# Patient Record
Sex: MALE | Race: WHITE | NOT HISPANIC OR LATINO | Employment: FULL TIME | URBAN - NONMETROPOLITAN AREA
[De-identification: names, ages, dates, MRNs, and addresses within clinical notes are randomized per-mention and may not be internally consistent; named-entity substitution may affect disease eponyms.]

---

## 2018-01-28 ENCOUNTER — APPOINTMENT (OUTPATIENT)
Dept: CT IMAGING | Facility: HOSPITAL | Age: 53
End: 2018-01-28
Attending: PHYSICIAN ASSISTANT
Payer: COMMERCIAL

## 2018-01-28 ENCOUNTER — HOSPITAL ENCOUNTER (EMERGENCY)
Facility: HOSPITAL | Age: 53
Discharge: HOME | End: 2018-01-28
Attending: PHYSICIAN ASSISTANT | Admitting: PHYSICIAN ASSISTANT
Payer: COMMERCIAL

## 2018-01-28 VITALS
RESPIRATION RATE: 20 BRPM | BODY MASS INDEX: 26.95 KG/M2 | SYSTOLIC BLOOD PRESSURE: 127 MMHG | HEART RATE: 81 BPM | OXYGEN SATURATION: 98 % | TEMPERATURE: 98.4 F | DIASTOLIC BLOOD PRESSURE: 80 MMHG | WEIGHT: 167 LBS

## 2018-01-28 DIAGNOSIS — N20.0 CALCULUS OF KIDNEY: ICD-10-CM

## 2018-01-28 LAB
ALBUMIN UR-MCNC: 10 MG/DL
ANION GAP SERPL CALCULATED.3IONS-SCNC: 9 MMOL/L (ref 3–14)
APPEARANCE UR: CLEAR
BACTERIA #/AREA URNS HPF: ABNORMAL /HPF
BASOPHILS # BLD AUTO: 0.1 10E9/L (ref 0–0.2)
BASOPHILS NFR BLD AUTO: 0.4 %
BILIRUB UR QL STRIP: NEGATIVE
BUN SERPL-MCNC: 13 MG/DL (ref 7–30)
CALCIUM SERPL-MCNC: 8.8 MG/DL (ref 8.5–10.1)
CHLORIDE SERPL-SCNC: 106 MMOL/L (ref 94–109)
CO2 SERPL-SCNC: 25 MMOL/L (ref 20–32)
COLOR UR AUTO: YELLOW
CREAT SERPL-MCNC: 0.7 MG/DL (ref 0.66–1.25)
DIFFERENTIAL METHOD BLD: ABNORMAL
EOSINOPHIL # BLD AUTO: 0.1 10E9/L (ref 0–0.7)
EOSINOPHIL NFR BLD AUTO: 0.5 %
ERYTHROCYTE [DISTWIDTH] IN BLOOD BY AUTOMATED COUNT: 12.1 % (ref 10–15)
GFR SERPL CREATININE-BSD FRML MDRD: >90 ML/MIN/1.7M2
GLUCOSE SERPL-MCNC: 117 MG/DL (ref 70–99)
GLUCOSE UR STRIP-MCNC: NEGATIVE MG/DL
HCT VFR BLD AUTO: 43.4 % (ref 40–53)
HGB BLD-MCNC: 15.1 G/DL (ref 13.3–17.7)
HGB UR QL STRIP: ABNORMAL
IMM GRANULOCYTES # BLD: 0.1 10E9/L (ref 0–0.4)
IMM GRANULOCYTES NFR BLD: 0.7 %
KETONES UR STRIP-MCNC: NEGATIVE MG/DL
LEUKOCYTE ESTERASE UR QL STRIP: NEGATIVE
LYMPHOCYTES # BLD AUTO: 2.1 10E9/L (ref 0.8–5.3)
LYMPHOCYTES NFR BLD AUTO: 14.3 %
MCH RBC QN AUTO: 31 PG (ref 26.5–33)
MCHC RBC AUTO-ENTMCNC: 34.8 G/DL (ref 31.5–36.5)
MCV RBC AUTO: 89 FL (ref 78–100)
MONOCYTES # BLD AUTO: 1 10E9/L (ref 0–1.3)
MONOCYTES NFR BLD AUTO: 7 %
MUCOUS THREADS #/AREA URNS LPF: PRESENT /LPF
NEUTROPHILS # BLD AUTO: 11.4 10E9/L (ref 1.6–8.3)
NEUTROPHILS NFR BLD AUTO: 77.1 %
NITRATE UR QL: NEGATIVE
NRBC # BLD AUTO: 0 10*3/UL
NRBC BLD AUTO-RTO: 0 /100
PH UR STRIP: 5.5 PH (ref 4.7–8)
PLATELET # BLD AUTO: 391 10E9/L (ref 150–450)
POTASSIUM SERPL-SCNC: 4.5 MMOL/L (ref 3.4–5.3)
RBC # BLD AUTO: 4.87 10E12/L (ref 4.4–5.9)
RBC #/AREA URNS AUTO: >182 /HPF (ref 0–2)
SODIUM SERPL-SCNC: 140 MMOL/L (ref 133–144)
SOURCE: ABNORMAL
SP GR UR STRIP: 1.01 (ref 1–1.03)
UROBILINOGEN UR STRIP-MCNC: NORMAL MG/DL (ref 0–2)
WBC # BLD AUTO: 14.8 10E9/L (ref 4–11)
WBC #/AREA URNS AUTO: 2 /HPF (ref 0–2)

## 2018-01-28 PROCEDURE — 85025 COMPLETE CBC W/AUTO DIFF WBC: CPT | Performed by: PHYSICIAN ASSISTANT

## 2018-01-28 PROCEDURE — 99285 EMERGENCY DEPT VISIT HI MDM: CPT | Mod: 25

## 2018-01-28 PROCEDURE — 74176 CT ABD & PELVIS W/O CONTRAST: CPT | Mod: TC

## 2018-01-28 PROCEDURE — 96376 TX/PRO/DX INJ SAME DRUG ADON: CPT

## 2018-01-28 PROCEDURE — 81001 URINALYSIS AUTO W/SCOPE: CPT | Performed by: PHYSICIAN ASSISTANT

## 2018-01-28 PROCEDURE — 96374 THER/PROPH/DIAG INJ IV PUSH: CPT

## 2018-01-28 PROCEDURE — 99284 EMERGENCY DEPT VISIT MOD MDM: CPT | Performed by: PHYSICIAN ASSISTANT

## 2018-01-28 PROCEDURE — 25000128 H RX IP 250 OP 636: Performed by: PHYSICIAN ASSISTANT

## 2018-01-28 PROCEDURE — 96375 TX/PRO/DX INJ NEW DRUG ADDON: CPT

## 2018-01-28 PROCEDURE — 36415 COLL VENOUS BLD VENIPUNCTURE: CPT | Performed by: PHYSICIAN ASSISTANT

## 2018-01-28 PROCEDURE — 80048 BASIC METABOLIC PNL TOTAL CA: CPT | Performed by: PHYSICIAN ASSISTANT

## 2018-01-28 RX ORDER — MORPHINE SULFATE 4 MG/ML
4 INJECTION, SOLUTION INTRAMUSCULAR; INTRAVENOUS ONCE
Status: COMPLETED | OUTPATIENT
Start: 2018-01-28 | End: 2018-01-28

## 2018-01-28 RX ORDER — MORPHINE SULFATE 4 MG/ML
4 INJECTION, SOLUTION INTRAMUSCULAR; INTRAVENOUS
Status: DISCONTINUED | OUTPATIENT
Start: 2018-01-28 | End: 2018-01-28 | Stop reason: HOSPADM

## 2018-01-28 RX ORDER — KETOROLAC TROMETHAMINE 10 MG/1
10 TABLET, FILM COATED ORAL EVERY 6 HOURS PRN
Qty: 20 TABLET | Refills: 0 | Status: SHIPPED | OUTPATIENT
Start: 2018-01-28 | End: 2018-04-04

## 2018-01-28 RX ORDER — ONDANSETRON 2 MG/ML
4 INJECTION INTRAMUSCULAR; INTRAVENOUS ONCE
Status: COMPLETED | OUTPATIENT
Start: 2018-01-28 | End: 2018-01-28

## 2018-01-28 RX ORDER — KETOROLAC TROMETHAMINE 30 MG/ML
30 INJECTION, SOLUTION INTRAMUSCULAR; INTRAVENOUS ONCE
Status: COMPLETED | OUTPATIENT
Start: 2018-01-28 | End: 2018-01-28

## 2018-01-28 RX ADMIN — MORPHINE SULFATE 4 MG: 4 INJECTION, SOLUTION INTRAMUSCULAR; INTRAVENOUS at 15:37

## 2018-01-28 RX ADMIN — ONDANSETRON 4 MG: 2 INJECTION INTRAMUSCULAR; INTRAVENOUS at 15:37

## 2018-01-28 RX ADMIN — KETOROLAC TROMETHAMINE 30 MG: 30 INJECTION, SOLUTION INTRAMUSCULAR at 16:58

## 2018-01-28 RX ADMIN — MORPHINE SULFATE 4 MG: 4 INJECTION, SOLUTION INTRAMUSCULAR; INTRAVENOUS at 16:58

## 2018-01-28 RX ADMIN — HYDROMORPHONE HYDROCHLORIDE 0.5 MG: 1 INJECTION, SOLUTION INTRAMUSCULAR; INTRAVENOUS; SUBCUTANEOUS at 16:07

## 2018-01-28 ASSESSMENT — ENCOUNTER SYMPTOMS
CARDIOVASCULAR NEGATIVE: 1
DYSURIA: 0
PSYCHIATRIC NEGATIVE: 1
CHILLS: 0
NAUSEA: 0
FLANK PAIN: 1
ABDOMINAL PAIN: 0
FEVER: 0
HEMATURIA: 0
NEUROLOGICAL NEGATIVE: 1
DIFFICULTY URINATING: 0
FREQUENCY: 0
BACK PAIN: 0
FATIGUE: 0

## 2018-01-28 NOTE — ED AVS SNAPSHOT
[facility]    [address] [place] [address]    Phone:  [phone]                                       Wesley Kim   MRN: [id number]    Department:  [facility]   Date of Visit:  1/28/2018           After Visit Summary Signature Page     I have received my discharge instructions, and my questions have been answered. I have discussed any challenges I see with this plan with the nurse or doctor.    ..........................................................................................................................................  Patient/Patient Representative Signature      ..........................................................................................................................................  Patient Representative Print Name and Relationship to Patient    ..................................................               ................................................  Date                                            Time    ..........................................................................................................................................  Reviewed by Signature/Title    ...................................................              ..............................................  Date                                                            Time

## 2018-01-28 NOTE — ED PROVIDER NOTES
History     Chief Complaint   Patient presents with     Flank Pain     R flank pain for last 1.5 hours - hx of kidney stones and total of 24 stents - last 2 placed July 2016     The history is provided by the patient. No  was used.     Wesley Kim is a 52 year old male with DM, recurrent renal stones, who presents with right flank pain starting over the past 2 hrs. Pain radiates into the right groin. No hematuria. No urgency. No fevers. He denies injury.      Problem List:    Patient Active Problem List    Diagnosis Date Noted     Renal colic 10/14/2015     Priority: Medium     Nephrolithiasis 10/14/2015     Priority: Medium        Past Medical History:    Past Medical History:   Diagnosis Date     Diabetes (H)      Epidermolysis bullosa      Peripheral neuropathy      Recurrent kidney stones        Past Surgical History:    Past Surgical History:   Procedure Laterality Date     GENITOURINARY SURGERY      14 procedures for stones       Family History:    Family History   Problem Relation Age of Onset     Thyroid Disease Mother      Hypertension Father      Hypertension Brother        Social History:  Marital Status:   [2]  Social History   Substance Use Topics     Smoking status: Current Every Day Smoker     Packs/day: 0.50     Years: 28.00     Smokeless tobacco: Never Used     Alcohol use No        Medications:      ketorolac (TORADOL) 10 MG tablet   OxyCODONE HCl (OXYCONTIN PO)   OxyCODONE HCl (OXYCONTIN PO)   PRAVASTATIN SODIUM PO   POTASSIUM CITRATE PO   METFORMIN HCL PO   ALLOPURINOL PO   LISINOPRIL PO   aspirin 81 MG tablet         Review of Systems   Constitutional: Negative for chills, fatigue and fever.   Cardiovascular: Negative.    Gastrointestinal: Negative for abdominal pain and nausea.   Genitourinary: Positive for flank pain. Negative for difficulty urinating, dysuria, frequency, hematuria and urgency.   Musculoskeletal: Negative for back pain.   Skin: Negative.     Neurological: Negative.    Psychiatric/Behavioral: Negative.        Physical Exam   BP: 163/77  Pulse: 81  Heart Rate: 67  Temp: 97.1  F (36.2  C)  Resp: 18  Weight: 75.8 kg (167 lb)  SpO2: 97 %      Physical Exam   Constitutional: He is oriented to person, place, and time. He appears well-developed and well-nourished. No distress.   Eyes: Conjunctivae are normal.   Cardiovascular: Normal rate, regular rhythm and normal heart sounds.    Pulmonary/Chest: Effort normal and breath sounds normal.   Abdominal: Soft. Bowel sounds are normal. There is no tenderness. There is CVA tenderness (right).   Neurological: He is alert and oriented to person, place, and time.   Skin: Skin is warm and dry.   Psychiatric: He has a normal mood and affect.   Nursing note and vitals reviewed.      ED Course     ED Course     Procedures      Labs Ordered and Resulted from Time of ED Arrival Up to the Time of Departure from the ED   URINE MACROSCOPIC WITH REFLEX TO MICRO - Abnormal; Notable for the following:        Result Value    Blood Urine Large (*)     Protein Albumin Urine 10 (*)     RBC Urine >182 (*)     Bacteria Urine None (*)     Mucous Urine Present (*)     All other components within normal limits   CBC WITH PLATELETS DIFFERENTIAL - Abnormal; Notable for the following:     WBC 14.8 (*)     Absolute Neutrophil 11.4 (*)     All other components within normal limits   BASIC METABOLIC PANEL - Abnormal; Notable for the following:     Glucose 117 (*)     All other components within normal limits     EXAMINATION: CT ABDOMEN PELVIS W/O CONTRAST, 1/28/2018 4:32 PM     TECHNIQUE:  Helical CT images from the lung bases through the  symphysis pubis were obtained  with IV contrast. Contrast dose:     COMPARISON: none     HISTORY: right flank pain, Hx stones;      FINDINGS:     There is dependent atelectasis at the lung bases.     The liver is free of masses or biliary ductal enlargement. No  calcified gallstones are seen.     The the  spleen and pancreas appear normal.     The adrenal glands are normal.     The right and left kidneys are free of masses. Multiple bilateral  renal calculi are noted. The largest measures approximately 15 mm in  diameter and the left kidney. There is dilation of the right renal  collecting system and right ureter down to the mid pelvic level where  3 to 4 mm in diameter calculus is noted.     The periaortic lymph nodes are normal in caliber.     No intraperitoneal masses or inflammatory changes are noted.     In the pelvis the bladder and rectum appear normal.     The regional skeleton is intact         IMPRESSION: 3 to 4 mm diameter right ureteral calculus at the mid  pelvic level. There is dilation of the right renal collecting system  associated with this calculus.  2. Multiple intrarenal calculi bilaterally.      MARCELINO JJ MD  Assessments & Plan (with Medical Decision Making)     I have reviewed the nursing notes.  I have reviewed the findings, diagnosis, plan and need for follow up with the patient.    Discharge Medication List as of 1/28/2018  6:05 PM      START taking these medications    Details   ketorolac (TORADOL) 10 MG tablet Take 1 tablet (10 mg) by mouth every 6 hours as needed for moderate pain, Disp-20 tablet, R-0, Local Print             Final diagnoses:   Calculus of kidney   After IVF and pain control, pt reports he thinks the stone shifted and he will pass it. I offered to contact [facility] urology and he declines. He states this is not the first, but in fact the 13,000th + stone he will have passed and would like his IV removed and to return home this evening. He does have an appt with his primary tomorrow and will arrange for urology follow up then. He will return here with ANY worsening, fevers, concerns prior. He is discharge pain free, Rx for toradol, and has flomax at home that he will take until he sees primary tomorrow.     1/28/2018   [facility]     Dalton Curran,  PA  01/29/18 0157

## 2018-01-28 NOTE — ED AVS SNAPSHOT
[facility]    [address] [address]    Phone:  [phone]                                       Wesley Kim   MRN: [id number]    Department:  [facility]   Date of Visit:  1/28/2018           Patient Information     Date Of Birth          1965        Your diagnoses for this visit were:     Calculus of kidney        You were seen by Dalton Currna PA.      Follow-up Information     Follow up with Ruben Vargas MD In 1 day.    Specialty:  Family Practice    Contact information:    [facility]  [address] [address] [phone]          Follow up with [facility].    Specialty:  EMERGENCY MEDICINE    Why:  If symptoms worsen    Contact information:    [address] [address] [phone]    Additional information:    From [facility]: Take [address]. Turn left at [address]. Turn left at the first stoplight on [address]. At the first stop sign, take a right onto [address]. Take a left into the parking lot and continue through until you reach the North enterance of the building.       From [place]: Take [address]. Take the [address] ramp towards [place]. Turn left onto [address]. Take a slight right onto [address]. Turn left at the first stoplight on [address]. At the first stop sign, take a right onto [address]. Take a left into the parking lot and continue through until you reach the North enterance of the building.       From [place]: Take [address]. Take a right at [address]. At the first stop sign, take a right onto [address]. Take a left into the parking lot and continue through until you reach the North enterance of the building.         Discharge Instructions       - flomax, toradol  - FLUIDS  - Dr Vargas tomorrow - back here with fever or return of pain    (2 more stones in kidney on  right, huge 1.5cm stone on left!)    Discharge References/Attachments     KIDNEY STONES, TREATING: EXPECTANT THERAPY (ENGLISH)         Review of your medicines      START taking        Dose / Directions Last dose taken    ketorolac 10 MG tablet   Commonly known as:  TORADOL   Dose:  10 mg   Quantity:  20 tablet        Take 1 tablet (10 mg) by mouth every 6 hours as needed for moderate pain   Refills:  0          Our records show that you are taking the medicines listed below. If these are incorrect, please call your family doctor or clinic.        Dose / Directions Last dose taken    ALLOPURINOL PO   Dose:  300 mg        Take 300 mg by mouth daily   Refills:  0        aspirin 81 MG tablet   Dose:  81 mg        Take 81 mg by mouth daily   Refills:  0        LISINOPRIL PO   Dose:  10 mg        Take 10 mg by mouth daily   Refills:  0        METFORMIN HCL PO   Dose:  850 mg        Take 850 mg by mouth 2 times daily (with meals)   Refills:  0        * OXYCONTIN PO   Dose:  40 mg        Take 40 mg by mouth every 12 hours   Refills:  0        * OXYCONTIN PO   Dose:  20 mg        Take 20 mg by mouth daily as needed   Refills:  0        POTASSIUM CITRATE PO   Dose:  10 mEq        Take 10 mEq by mouth 2 times daily   Refills:  0        PRAVASTATIN SODIUM PO   Dose:  80 mg        Take 80 mg by mouth daily   Refills:  0        * Notice:  This list has 2 medication(s) that are the same as other medications prescribed for you. Read the directions carefully, and ask your doctor or other care provider to review them with you.            Prescriptions were sent or printed at these locations (1 Prescription)                   [facility] [address] [address]    Telephone:  [phone]   Fax:  [phone]   Hours:                  Printed at Department/Unit printer (1 of 1)         ketorolac (TORADOL) 10 MG tablet               "  Procedures and tests performed during your visit     Abd/pelvis CT - no contrast - Stone Protocol    Basic metabolic panel    CBC with platelets differential    UA reflex to Microscopic      Orders Needing Specimen Collection     None      Pending Results     No orders found from 2018 to 2018.            Pending Culture Results     No orders found from 2018 to 2018.            Thank you for choosing [facility]       Thank you for choosing [facility] for your care. Our goal is always to provide you with excellent care. Hearing back from our patients is one way we can continue to improve our services. Please take a few minutes to complete the written survey that you may receive in the mail after you visit with us. Thank you!        [employer] Information     [employer] lets you send messages to your doctor, view your test results, renew your prescriptions, schedule appointments and more. To sign up, go to [email or web address] . Click on \"Log in\" on the left side of the screen, which will take you to the Welcome page. Then click on \"Sign up Now\" on the right side of the page.     You will be asked to enter the access code listed below, as well as some personal information. Please follow the directions to create your username and password.     Your access code is: [email or web address]  Expires: 2018  6:01 PM     Your access code will  in 90 days. If you need help or a new code, please call your [facility] clinic or [phone].        Care EveryWhere ID     This is your Care EveryWhere ID. This could be used by other organizations to access your [facility] medical records  [id number]        Equal Access to Services     [facility] MARAH : [place] to Soson, waaxda luqadaha, qaybta kaalmada adefrederic, jordan davis. So [facility] [phone].    ATENCIÓN: Si habla español, tiene a rehman disposición servicios gratuitos de asistencia lingüística. Llame al [phone].    We " comply with applicable federal civil rights laws and [place] laws. We do not discriminate on the basis of race, color, national origin, age, disability, sex, sexual orientation, or gender identity.            After Visit Summary       This is your record. Keep this with you and show to your community pharmacist(s) and doctor(s) at your next visit.

## 2018-01-29 NOTE — DISCHARGE INSTRUCTIONS
- flomax, toradol  - FLUIDS  - Dr Vargas tomorrow - back here with fever or return of pain    (2 more stones in kidney on right, huge 1.5cm stone on left!)

## 2018-04-04 ENCOUNTER — HOSPITAL ENCOUNTER (EMERGENCY)
Facility: HOSPITAL | Age: 53
Discharge: HOME | End: 2018-04-04
Attending: FAMILY MEDICINE | Admitting: FAMILY MEDICINE
Payer: COMMERCIAL

## 2018-04-04 VITALS
SYSTOLIC BLOOD PRESSURE: 151 MMHG | TEMPERATURE: 97.6 F | OXYGEN SATURATION: 98 % | HEART RATE: 64 BPM | RESPIRATION RATE: 18 BRPM | DIASTOLIC BLOOD PRESSURE: 109 MMHG

## 2018-04-04 DIAGNOSIS — G25.3 SLEEP MYOCLONUS: ICD-10-CM

## 2018-04-04 LAB
ALBUMIN SERPL-MCNC: 4 G/DL (ref 3.4–5)
ALP SERPL-CCNC: 98 U/L (ref 40–150)
ALT SERPL W P-5'-P-CCNC: 33 U/L (ref 0–70)
ANION GAP SERPL CALCULATED.3IONS-SCNC: 8 MMOL/L (ref 3–14)
AST SERPL W P-5'-P-CCNC: 19 U/L (ref 0–45)
BILIRUB SERPL-MCNC: 0.6 MG/DL (ref 0.2–1.3)
BUN SERPL-MCNC: 9 MG/DL (ref 7–30)
CALCIUM SERPL-MCNC: 8.5 MG/DL (ref 8.5–10.1)
CHLORIDE SERPL-SCNC: 108 MMOL/L (ref 94–109)
CO2 SERPL-SCNC: 24 MMOL/L (ref 20–32)
CREAT SERPL-MCNC: 0.76 MG/DL (ref 0.66–1.25)
GFR SERPL CREATININE-BSD FRML MDRD: >90 ML/MIN/1.7M2
GLUCOSE SERPL-MCNC: 156 MG/DL (ref 70–99)
MAGNESIUM SERPL-MCNC: 2.2 MG/DL (ref 1.6–2.3)
POTASSIUM SERPL-SCNC: 3.7 MMOL/L (ref 3.4–5.3)
PROT SERPL-MCNC: 7.7 G/DL (ref 6.8–8.8)
SODIUM SERPL-SCNC: 140 MMOL/L (ref 133–144)

## 2018-04-04 PROCEDURE — 80053 COMPREHEN METABOLIC PANEL: CPT | Performed by: FAMILY MEDICINE

## 2018-04-04 PROCEDURE — 36415 COLL VENOUS BLD VENIPUNCTURE: CPT | Performed by: FAMILY MEDICINE

## 2018-04-04 PROCEDURE — 99283 EMERGENCY DEPT VISIT LOW MDM: CPT

## 2018-04-04 PROCEDURE — 83735 ASSAY OF MAGNESIUM: CPT | Performed by: FAMILY MEDICINE

## 2018-04-04 PROCEDURE — 99284 EMERGENCY DEPT VISIT MOD MDM: CPT | Performed by: PHYSICIAN ASSISTANT

## 2018-04-04 RX ORDER — CLONAZEPAM 1 MG/1
1 TABLET ORAL
Qty: 15 TABLET | Refills: 0 | Status: SHIPPED | OUTPATIENT
Start: 2018-04-04 | End: 2020-04-18

## 2018-04-04 ASSESSMENT — ENCOUNTER SYMPTOMS
HEADACHES: 0
DIZZINESS: 0
ABDOMINAL PAIN: 0
FEVER: 0
CHILLS: 0
SHORTNESS OF BREATH: 0
SLEEP DISTURBANCE: 1
LIGHT-HEADEDNESS: 0
WEAKNESS: 0
NUMBNESS: 0

## 2018-04-04 NOTE — DISCHARGE INSTRUCTIONS
Your symptoms are consistent with sleep myoclonus.  This is rarely associated with significant neurologic disease, but can be a sign of disease is it continues.  Good sleep hygiene is very important.  Being a night owl can be good for some things, but bad for others.  You should stop the muscle relaxer.  I have prescribed a medication to hopefully help your symptoms.  This medication can be sedating, but has some evidence to show it helps sleep jerking. This should be started about 30 minutes prior to bedtime and not used during the day.  Your labs are OK.  Follow-up in the clinic for persistent symptoms.  Return here as needed.

## 2018-04-04 NOTE — ED AVS SNAPSHOT
[facility]    [address] [place] [address]    Phone:  [phone]                                       Wesley Kim   MRN: [id number]    Department[facility]  [facility]   Date of Visit:  4/4/2018           After Visit Summary Signature Page     I have received my discharge instructions, and my questions have been answered. I have discussed any challenges I see with this plan with the nurse or doctor.    ..........................................................................................................................................  Patient/Patient Representative Signature      ..........................................................................................................................................  Patient Representative Print Name and Relationship to Patient    ..................................................               ................................................  Date                                            Time    ..........................................................................................................................................  Reviewed by Signature/Title    ...................................................              ..............................................  Date                                                            Time

## 2018-04-04 NOTE — ED PROVIDER NOTES
History     Chief Complaint   Patient presents with     Flank Pain     hx of kidney stones     Insomnia     Spasms     The history is provided by the patient.     Wesley Kim is a 52 year old male who presented to the emergency department ambulatory for evaluation of sleep jerking. Wesley tells me that he has began to experience jerking when falling asleep over the last 2 weeks and has not been able to sleep well.  He was seen yesterday in the clinic and started on Baclofen.  This has not helped.  He is rather desperate for help sleeping.  He has no weakness. No jerking while awake.  Does not drink.  Reports that spasms are significant when trying to sleep.     Problem List:    Patient Active Problem List    Diagnosis Date Noted     Renal colic 10/14/2015     Priority: Medium     Nephrolithiasis 10/14/2015     Priority: Medium        Past Medical History:    Past Medical History:   Diagnosis Date     Diabetes (H)      Epidermolysis bullosa      Peripheral neuropathy      Recurrent kidney stones        Past Surgical History:    Past Surgical History:   Procedure Laterality Date     GENITOURINARY SURGERY      14 procedures for stones       Family History:    Family History   Problem Relation Age of Onset     Thyroid Disease Mother      Hypertension Father      Hypertension Brother        Social History:  Marital Status:   [2]  Social History   Substance Use Topics     Smoking status: Current Every Day Smoker     Packs/day: 0.50     Years: 28.00     Smokeless tobacco: Never Used     Alcohol use No        Medications:      clonazePAM (KLONOPIN) 1 MG tablet   PRAVASTATIN SODIUM PO   POTASSIUM CITRATE PO   METFORMIN HCL PO   ALLOPURINOL PO   LISINOPRIL PO   aspirin 81 MG tablet         Review of Systems   Constitutional: Negative for chills and fever.   Respiratory: Negative for shortness of breath.    Gastrointestinal: Negative for abdominal pain.   Genitourinary: Negative.    Skin: Negative.    Neurological:  Negative for dizziness, weakness, light-headedness, numbness and headaches.   Psychiatric/Behavioral: Positive for sleep disturbance.       Physical Exam   BP: 170/85  Pulse: 73  Temp: 97.2  F (36.2  C)  Resp: 18  SpO2: 98 %      Physical Exam   Constitutional: He is oriented to person, place, and time. He appears well-developed and well-nourished.   Pleasant and talkative    Cardiovascular: Normal rate and regular rhythm.    Pulmonary/Chest: Effort normal.   Neurological: He is alert and oriented to person, place, and time.   Normal gait.    Skin: Skin is dry.   Psychiatric: He has a normal mood and affect.   Nursing note and vitals reviewed.      ED Course     ED Course     Procedures               Critical Care time:  none               Results for orders placed or performed during the hospital encounter of 04/04/18 (from the past 24 hour(s))   Comprehensive metabolic panel   Result Value Ref Range    Sodium 140 133 - 144 mmol/L    Potassium 3.7 3.4 - 5.3 mmol/L    Chloride 108 94 - 109 mmol/L    Carbon Dioxide 24 20 - 32 mmol/L    Anion Gap 8 3 - 14 mmol/L    Glucose 156 (H) 70 - 99 mg/dL    Urea Nitrogen 9 7 - 30 mg/dL    Creatinine 0.76 0.66 - 1.25 mg/dL    GFR Estimate >90 >60 mL/min/1.7m2    GFR Estimate If Black >90 >60 mL/min/1.7m2    Calcium 8.5 8.5 - 10.1 mg/dL    Bilirubin Total 0.6 0.2 - 1.3 mg/dL    Albumin 4.0 3.4 - 5.0 g/dL    Protein Total 7.7 6.8 - 8.8 g/dL    Alkaline Phosphatase 98 40 - 150 U/L    ALT 33 0 - 70 U/L    AST 19 0 - 45 U/L   Magnesium   Result Value Ref Range    Magnesium 2.2 1.6 - 2.3 mg/dL       Medications - No data to display    Assessments & Plan (with Medical Decision Making)   Labs as above.  Wesley is desperate for some help.  Seems low risk for significant neurological disease causing the symptoms, but obviously cannot be zero risk.  Needs close close follow-up with Dr. Vargas.  Stop the Baclofen. Some evidence for Clonazepam.  Long discussion.  Short prescription.  Return  here as needed.     I have reviewed the nursing notes.    I have reviewed the findings, diagnosis, plan and need for follow up with the patient.       New Prescriptions    CLONAZEPAM (KLONOPIN) 1 MG TABLET    Take 1 tablet (1 mg) by mouth nightly as needed for anxiety       Final diagnoses:   Sleep myoclonus       4/4/2018   [facility]     Yissel Benites PA-C  04/04/18 1045

## 2018-04-04 NOTE — ED NOTES
In triage, pt had a long list of complaints from diarrhea to muscle spasms that he received a muscle relaxer from Dr Ware yesterday.  Pt reports they didn't help and took 4 tabs with no relief.

## 2018-04-04 NOTE — ED AVS SNAPSHOT
[facility]    [address] [address]    Phone:  [phone]                                       Wesley Kim   MRN: [id number]    Department:  [facility]   Date of Visit:  4/4/2018           Patient Information     Date Of Birth          1965        Your diagnoses for this visit were:     Sleep myoclonus        You were seen by Sugar Becker MD and Yissel Benites PA-C.      Follow-up Information     Schedule an appointment as soon as possible for a visit with Ruben Vargas MD.    Specialty:  Family Practice    Contact information:    [facility]  [address] [address] [phone]          Follow up with [facility].    Specialty:  EMERGENCY MEDICINE    Why:  If symptoms worsen    Contact information:    [address] [address] [phone]    Additional information:    From [facility], [place]: Take [address]. Turn left at [address]. Turn left at the first stoplight on [address]. At the first stop sign, take a right onto [address]. Take a left into the parking lot and continue through until you reach the North enterance of the building.       From [place]: Take [address]. Take the [address] ramp towards [place]. Turn left onto [address]. Take a slight right onto [address]. Turn left at the first stoplight on [address]. At the first stop sign, take a right onto [address]. Take a left into the parking lot and continue through until you reach the North enterance of the building.       From [place]: Take [address]. Take a right at [address]. At the first stop sign, take a right onto [address]. Take a left into the parking lot and continue through until you reach the North enterance of the building.         Discharge Instructions       Your symptoms are consistent with sleep myoclonus.  This  is rarely associated with significant neurologic disease, but can be a sign of disease is it continues.  Good sleep hygiene is very important.  Being a night owl can be good for some things, but bad for others.  You should stop the muscle relaxer.  I have prescribed a medication to hopefully help your symptoms.  This medication can be sedating, but has some evidence to show it helps sleep jerking. This should be started about 30 minutes prior to bedtime and not used during the day.  Your labs are OK.  Follow-up in the clinic for persistent symptoms.  Return here as needed.        Review of your medicines      START taking        Dose / Directions Last dose taken    clonazePAM 1 MG tablet   Commonly known as:  klonoPIN   Dose:  1 mg   Quantity:  15 tablet        Take 1 tablet (1 mg) by mouth nightly as needed for anxiety   Refills:  0          Our records show that you are taking the medicines listed below. If these are incorrect, please call your family doctor or clinic.        Dose / Directions Last dose taken    ALLOPURINOL PO   Dose:  300 mg        Take 300 mg by mouth daily   Refills:  0        aspirin 81 MG tablet   Dose:  81 mg        Take 81 mg by mouth daily   Refills:  0        LISINOPRIL PO   Dose:  10 mg        Take 10 mg by mouth daily   Refills:  0        METFORMIN HCL PO   Dose:  850 mg        Take 850 mg by mouth 2 times daily (with meals)   Refills:  0        POTASSIUM CITRATE PO   Dose:  10 mEq        Take 10 mEq by mouth 2 times daily   Refills:  0        PRAVASTATIN SODIUM PO   Dose:  80 mg        Take 80 mg by mouth daily   Refills:  0                Prescriptions were sent or printed at these locations (1 Prescription)                   [facility]  [address] [address]    Telephone:  [phone]   Fax:  [phone]   Hours:                  Printed at Department/Unit printer (1 of 1)          "clonazePAM (KLONOPIN) 1 MG tablet                Procedures and tests performed during your visit     Comprehensive metabolic panel    Magnesium      Orders Needing Specimen Collection     None      Pending Results     No orders found from 2018 to 2018.            Pending Culture Results     No orders found from 2018 to 2018.            Thank you for choosing [facility]       Thank you for choosing [facility] for your care. Our goal is always to provide you with excellent care. Hearing back from our patients is one way we can continue to improve our services. Please take a few minutes to complete the written survey that you may receive in the mail after you visit with us. Thank you!        [employer] Information     playnik lets you send messages to your doctor, view your test results, renew your prescriptions, schedule appointments and more. To sign up, go to [email or web address] . Click on \"Log in\" on the left side of the screen, which will take you to the Welcome page. Then click on \"Sign up Now\" on the right side of the page.     You will be asked to enter the access code listed below, as well as some personal information. Please follow the directions to create your username and password.     Your access code is: [email or web address]  Expires: 2018  7:01 PM     Your access code will  in 90 days. If you need help or a new code, please call your [facility] clinic or [phone].        Care EveryWhere ID     This is your Care EveryWhere ID. This could be used by other organizations to access your [facility] medical records  [id number]        Equal Access to Services     [facility] : Hadii joanna alberts hadasho Sokurtali, waaxda luqadaha, qaybta kaalmada adeegyada, jordan davis. So [facility] [phone].    ATENCIÓN: Si habla español, tiene a rehman disposición servicios gratuitos de asistencia lingüística. Llame al [phone].    We comply with applicable federal civil rights laws " and [place] laws. We do not discriminate on the basis of race, color, national origin, age, disability, sex, sexual orientation, or gender identity.            After Visit Summary       This is your record. Keep this with you and show to your community pharmacist(s) and doctor(s) at your next visit.

## 2020-04-18 ENCOUNTER — HOSPITAL ENCOUNTER (EMERGENCY)
Facility: HOSPITAL | Age: 55
Discharge: HOME | End: 2020-04-18
Attending: EMERGENCY MEDICINE | Admitting: EMERGENCY MEDICINE
Payer: COMMERCIAL

## 2020-04-18 ENCOUNTER — APPOINTMENT (OUTPATIENT)
Dept: CT IMAGING | Facility: HOSPITAL | Age: 55
End: 2020-04-18
Attending: EMERGENCY MEDICINE
Payer: COMMERCIAL

## 2020-04-18 VITALS
OXYGEN SATURATION: 95 % | SYSTOLIC BLOOD PRESSURE: 140 MMHG | TEMPERATURE: 97.9 F | RESPIRATION RATE: 16 BRPM | DIASTOLIC BLOOD PRESSURE: 75 MMHG

## 2020-04-18 DIAGNOSIS — N20.0 KIDNEY STONE: ICD-10-CM

## 2020-04-18 LAB
ALBUMIN UR-MCNC: 10 MG/DL
ANION GAP SERPL CALCULATED.3IONS-SCNC: 5 MMOL/L (ref 3–14)
APPEARANCE UR: CLEAR
BACTERIA #/AREA URNS HPF: ABNORMAL /HPF
BASOPHILS # BLD AUTO: 0.1 10E9/L (ref 0–0.2)
BASOPHILS NFR BLD AUTO: 0.5 %
BILIRUB UR QL STRIP: NEGATIVE
BUN SERPL-MCNC: 12 MG/DL (ref 7–30)
CALCIUM SERPL-MCNC: 8.5 MG/DL (ref 8.5–10.1)
CHLORIDE SERPL-SCNC: 106 MMOL/L (ref 94–109)
CO2 SERPL-SCNC: 24 MMOL/L (ref 20–32)
COLOR UR AUTO: YELLOW
CREAT SERPL-MCNC: 0.86 MG/DL (ref 0.66–1.25)
DIFFERENTIAL METHOD BLD: ABNORMAL
EOSINOPHIL # BLD AUTO: 0.1 10E9/L (ref 0–0.7)
EOSINOPHIL NFR BLD AUTO: 0.7 %
ERYTHROCYTE [DISTWIDTH] IN BLOOD BY AUTOMATED COUNT: 12.3 % (ref 10–15)
GFR SERPL CREATININE-BSD FRML MDRD: >90 ML/MIN/{1.73_M2}
GLUCOSE SERPL-MCNC: 172 MG/DL (ref 70–99)
GLUCOSE UR STRIP-MCNC: NEGATIVE MG/DL
HCT VFR BLD AUTO: 45.2 % (ref 40–53)
HGB BLD-MCNC: 15.8 G/DL (ref 13.3–17.7)
HGB UR QL STRIP: ABNORMAL
HYALINE CASTS #/AREA URNS LPF: 1 /LPF
IMM GRANULOCYTES # BLD: 0.1 10E9/L (ref 0–0.4)
IMM GRANULOCYTES NFR BLD: 0.6 %
KETONES UR STRIP-MCNC: NEGATIVE MG/DL
LEUKOCYTE ESTERASE UR QL STRIP: NEGATIVE
LYMPHOCYTES # BLD AUTO: 1.8 10E9/L (ref 0.8–5.3)
LYMPHOCYTES NFR BLD AUTO: 16.3 %
MCH RBC QN AUTO: 31.2 PG (ref 26.5–33)
MCHC RBC AUTO-ENTMCNC: 35 G/DL (ref 31.5–36.5)
MCV RBC AUTO: 89 FL (ref 78–100)
MONOCYTES # BLD AUTO: 0.9 10E9/L (ref 0–1.3)
MONOCYTES NFR BLD AUTO: 8 %
MUCOUS THREADS #/AREA URNS LPF: PRESENT /LPF
NEUTROPHILS # BLD AUTO: 8.3 10E9/L (ref 1.6–8.3)
NEUTROPHILS NFR BLD AUTO: 73.9 %
NITRATE UR QL: NEGATIVE
NRBC # BLD AUTO: 0 10*3/UL
NRBC BLD AUTO-RTO: 0 /100
PH UR STRIP: 5 PH (ref 4.7–8)
PLATELET # BLD AUTO: 277 10E9/L (ref 150–450)
POTASSIUM SERPL-SCNC: 3.9 MMOL/L (ref 3.4–5.3)
RBC # BLD AUTO: 5.07 10E12/L (ref 4.4–5.9)
RBC #/AREA URNS AUTO: 57 /HPF (ref 0–2)
SODIUM SERPL-SCNC: 135 MMOL/L (ref 133–144)
SOURCE: ABNORMAL
SP GR UR STRIP: 1.03 (ref 1–1.03)
UROBILINOGEN UR STRIP-MCNC: NORMAL MG/DL (ref 0–2)
WBC # BLD AUTO: 11.2 10E9/L (ref 4–11)
WBC #/AREA URNS AUTO: 3 /HPF (ref 0–5)

## 2020-04-18 PROCEDURE — 25000128 H RX IP 250 OP 636: Performed by: EMERGENCY MEDICINE

## 2020-04-18 PROCEDURE — 96361 HYDRATE IV INFUSION ADD-ON: CPT

## 2020-04-18 PROCEDURE — 74176 CT ABD & PELVIS W/O CONTRAST: CPT | Mod: TC

## 2020-04-18 PROCEDURE — 36415 COLL VENOUS BLD VENIPUNCTURE: CPT | Performed by: EMERGENCY MEDICINE

## 2020-04-18 PROCEDURE — 96375 TX/PRO/DX INJ NEW DRUG ADDON: CPT

## 2020-04-18 PROCEDURE — 81001 URINALYSIS AUTO W/SCOPE: CPT | Performed by: EMERGENCY MEDICINE

## 2020-04-18 PROCEDURE — 99285 EMERGENCY DEPT VISIT HI MDM: CPT | Mod: Z6 | Performed by: EMERGENCY MEDICINE

## 2020-04-18 PROCEDURE — 96374 THER/PROPH/DIAG INJ IV PUSH: CPT

## 2020-04-18 PROCEDURE — 85025 COMPLETE CBC W/AUTO DIFF WBC: CPT | Performed by: EMERGENCY MEDICINE

## 2020-04-18 PROCEDURE — 25800030 ZZH RX IP 258 OP 636: Performed by: EMERGENCY MEDICINE

## 2020-04-18 PROCEDURE — 80048 BASIC METABOLIC PNL TOTAL CA: CPT | Performed by: EMERGENCY MEDICINE

## 2020-04-18 PROCEDURE — 99285 EMERGENCY DEPT VISIT HI MDM: CPT | Mod: 25

## 2020-04-18 RX ORDER — MORPHINE SULFATE 4 MG/ML
4 INJECTION, SOLUTION INTRAMUSCULAR; INTRAVENOUS ONCE
Status: COMPLETED | OUTPATIENT
Start: 2020-04-18 | End: 2020-04-18

## 2020-04-18 RX ORDER — LOSARTAN POTASSIUM 50 MG/1
50 TABLET ORAL DAILY
COMMUNITY
Start: 2020-03-27

## 2020-04-18 RX ORDER — KETOROLAC TROMETHAMINE 30 MG/ML
30 INJECTION, SOLUTION INTRAMUSCULAR; INTRAVENOUS ONCE
Status: COMPLETED | OUTPATIENT
Start: 2020-04-18 | End: 2020-04-18

## 2020-04-18 RX ORDER — SODIUM CHLORIDE 9 MG/ML
1000 INJECTION, SOLUTION INTRAVENOUS CONTINUOUS
Status: DISCONTINUED | OUTPATIENT
Start: 2020-04-18 | End: 2020-04-18 | Stop reason: HOSPADM

## 2020-04-18 RX ADMIN — SODIUM CHLORIDE 1000 ML: 9 INJECTION, SOLUTION INTRAVENOUS at 09:19

## 2020-04-18 RX ADMIN — KETOROLAC TROMETHAMINE 30 MG: 30 INJECTION, SOLUTION INTRAMUSCULAR at 09:20

## 2020-04-18 RX ADMIN — MORPHINE SULFATE 4 MG: 4 INJECTION, SOLUTION INTRAMUSCULAR; INTRAVENOUS at 09:20

## 2020-04-18 ASSESSMENT — ENCOUNTER SYMPTOMS
CONSTITUTIONAL NEGATIVE: 1
PHOTOPHOBIA: 0
MYALGIAS: 0
NECK PAIN: 0
RESPIRATORY NEGATIVE: 1
EYES NEGATIVE: 1
GASTROINTESTINAL NEGATIVE: 1
NECK STIFFNESS: 0
CARDIOVASCULAR NEGATIVE: 1
ENDOCRINE NEGATIVE: 1
NEUROLOGICAL NEGATIVE: 1
HEMATOLOGIC/LYMPHATIC NEGATIVE: 1
ALLERGIC/IMMUNOLOGIC NEGATIVE: 1
FLANK PAIN: 1
PSYCHIATRIC NEGATIVE: 1

## 2020-04-18 NOTE — DISCHARGE INSTRUCTIONS
1) Follow the aftercare instructions provided.     2) Take all prescribed medications.     3) You must follow up with urology next week.

## 2020-04-18 NOTE — ED AVS SNAPSHOT
[facility]  [address] [address]  Phone:  [phone]                                    Wesley Kim   MRN: [id number]    Department:  [facility]   Date of Visit:  4/18/2020           After Visit Summary Signature Page    I have received my discharge instructions, and my questions have been answered. I have discussed any challenges I see with this plan with the nurse or doctor.    ..........................................................................................................................................  Patient/Patient Representative Signature      ..........................................................................................................................................  Patient Representative Print Name and Relationship to Patient    ..................................................               ................................................  Date                                   Time    ..........................................................................................................................................  Reviewed by Signature/Title    ...................................................              ..............................................  Date                                               Time          22EPIC Rev 08/18

## 2020-04-18 NOTE — ED PROVIDER NOTES
History     Chief Complaint   Patient presents with     Flank Pain     HPI  Wesley Kim is a 54 year old male who presents today with complaints of sudden onset of right flank pain radiating to his right testicle.  Has history of kidney stones.  Symptoms appear similar.  Symptoms gradually worsening prompting ER visit today.  Patient denies any other complaints.  Allergies:  Allergies   Allergen Reactions     Codeine Nausea     Tegretol [Carbamazepine]      Lost vision         Problem List:    Patient Active Problem List    Diagnosis Date Noted     Renal colic 10/14/2015     Priority: Medium     Nephrolithiasis 10/14/2015     Priority: Medium        Past Medical History:    Past Medical History:   Diagnosis Date     Diabetes (H)      Epidermolysis bullosa      Peripheral neuropathy      Recurrent kidney stones        Past Surgical History:    Past Surgical History:   Procedure Laterality Date     GENITOURINARY SURGERY      14 procedures for stones       Family History:    Family History   Problem Relation Age of Onset     Thyroid Disease Mother      Hypertension Father      Hypertension Brother        Social History:  Marital Status:   [2]  Social History     Tobacco Use     Smoking status: Current Every Day Smoker     Packs/day: 0.50     Years: 28.00     Pack years: 14.00     Smokeless tobacco: Never Used   Substance Use Topics     Alcohol use: No     Drug use: No        Medications:    aspirin 81 MG tablet  losartan (COZAAR) 50 MG tablet  METFORMIN HCL PO          Review of Systems   Constitutional: Negative.    HENT: Negative.    Eyes: Negative.  Negative for photophobia and visual disturbance.   Respiratory: Negative.    Cardiovascular: Negative.    Gastrointestinal: Negative.    Endocrine: Negative.    Genitourinary: Positive for flank pain. Negative for scrotal swelling.   Musculoskeletal: Negative for myalgias, neck pain and neck stiffness.   Skin: Negative.    Allergic/Immunologic: Negative.     Neurological: Negative.    Hematological: Negative.    Psychiatric/Behavioral: Negative.        Physical Exam   BP: 158/83  Heart Rate: 56  Temp: 97.9  F (36.6  C)  Resp: 16  SpO2: 97 %      Physical Exam  Constitutional:       General: He is not in acute distress.     Appearance: He is normal weight. He is not toxic-appearing.   HENT:      Head: Normocephalic and atraumatic.   Neck:      Musculoskeletal: Normal range of motion. No neck rigidity.   Cardiovascular:      Rate and Rhythm: Normal rate and regular rhythm.      Pulses: Normal pulses.   Pulmonary:      Effort: Pulmonary effort is normal.   Abdominal:      General: Abdomen is flat. There is no distension.      Tenderness: There is no abdominal tenderness. There is no right CVA tenderness, left CVA tenderness or guarding.   Musculoskeletal:      Right lower leg: No edema.      Left lower leg: No edema.      Comments: Subjective right flank tenderness.  No CVA tenderness.   Lymphadenopathy:      Cervical: No cervical adenopathy.   Skin:     General: Skin is warm.      Capillary Refill: Capillary refill takes less than 2 seconds.   Neurological:      General: No focal deficit present.      Mental Status: He is alert.      Cranial Nerves: No cranial nerve deficit.      Sensory: No sensory deficit.      Motor: No weakness.      Gait: Gait normal.   Psychiatric:         Mood and Affect: Mood normal.         Behavior: Behavior normal.         Thought Content: Thought content normal.         Judgment: Judgment normal.         ED Course     ED Course as of Apr 18 1025   Sat Apr 18, 2020   1015 Feels better. No pain. Ct noted. Patient to have follow up with urology next week        Procedures           Results for orders placed or performed during the hospital encounter of 04/18/20 (from the past 24 hour(s))   UA with Microscopic   Result Value Ref Range    Color Urine Yellow     Appearance Urine Clear     Glucose Urine Negative NEG^Negative mg/dL    Bilirubin  Urine Negative NEG^Negative    Ketones Urine Negative NEG^Negative mg/dL    Specific Gravity Urine 1.026 1.003 - 1.035    Blood Urine Moderate (A) NEG^Negative    pH Urine 5.0 4.7 - 8.0 pH    Protein Albumin Urine 10 (A) NEG^Negative mg/dL    Urobilinogen mg/dL Normal 0.0 - 2.0 mg/dL    Nitrite Urine Negative NEG^Negative    Leukocyte Esterase Urine Negative NEG^Negative    Source Midstream Urine     WBC Urine 3 0 - 5 /HPF    RBC Urine 57 (H) 0 - 2 /HPF    Bacteria Urine None (A) NEG^Negative /HPF    Mucous Urine Present (A) NEG^Negative /LPF    Hyaline Casts 1 (A) OTO2^O - 2 /LPF   Basic metabolic panel   Result Value Ref Range    Sodium 135 133 - 144 mmol/L    Potassium 3.9 3.4 - 5.3 mmol/L    Chloride 106 94 - 109 mmol/L    Carbon Dioxide 24 20 - 32 mmol/L    Anion Gap 5 3 - 14 mmol/L    Glucose 172 (H) 70 - 99 mg/dL    Urea Nitrogen 12 7 - 30 mg/dL    Creatinine 0.86 0.66 - 1.25 mg/dL    GFR Estimate >90 >60 mL/min/[1.73_m2]    GFR Estimate If Black >90 >60 mL/min/[1.73_m2]    Calcium 8.5 8.5 - 10.1 mg/dL   CBC with platelets differential   Result Value Ref Range    WBC 11.2 (H) 4.0 - 11.0 10e9/L    RBC Count 5.07 4.4 - 5.9 10e12/L    Hemoglobin 15.8 13.3 - 17.7 g/dL    Hematocrit 45.2 40.0 - 53.0 %    MCV 89 78 - 100 fl    MCH 31.2 26.5 - 33.0 pg    MCHC 35.0 31.5 - 36.5 g/dL    RDW 12.3 10.0 - 15.0 %    Platelet Count 277 150 - 450 10e9/L    Diff Method Automated Method     % Neutrophils 73.9 %    % Lymphocytes 16.3 %    % Monocytes 8.0 %    % Eosinophils 0.7 %    % Basophils 0.5 %    % Immature Granulocytes 0.6 %    Nucleated RBCs 0 0 /100    Absolute Neutrophil 8.3 1.6 - 8.3 10e9/L    Absolute Lymphocytes 1.8 0.8 - 5.3 10e9/L    Absolute Monocytes 0.9 0.0 - 1.3 10e9/L    Absolute Eosinophils 0.1 0.0 - 0.7 10e9/L    Absolute Basophils 0.1 0.0 - 0.2 10e9/L    Abs Immature Granulocytes 0.1 0 - 0.4 10e9/L    Absolute Nucleated RBC 0.0    CT Abdomen Pelvis w/o Contrast    Narrative    EXAMINATION: CT ABDOMEN  PELVIS W/O CONTRAST, 4/18/2020 9:38 AM    TECHNIQUE:  Helical CT images from the lung bases through the  symphysis pubis were obtained  without IV contrast. Contrast dose:    COMPARISON: none    HISTORY: Flank pain, recurrent stone disease suspected    FINDINGS:    There is dependent atelectasis at the lung bases.    The liver is free of masses or biliary ductal enlargement. No  calcified gallstones are seen.    The the spleen and pancreas appear normal.    The adrenal glands are normal.    There is dilation of the right renal collecting system and right  ureter proximally. No right ureteric calculi are noted. There are  multiple calculi seen in the left kidney. The left ureter is  nondilated. Close to the ureterovesical junction on the left is a 3 mm  in diameter calculus. This is possibly a recently passed right  ureteric calculus or a small calculus at the ureterovesical junction  on the left which is nonobstructing. In the left kidney the largest  calculus measures 8 mm in diameter. In the right kidney a solitary 2  mm in diameter calculus is seen.    The periaortic lymph nodes are normal in caliber.    No intraperitoneal masses or inflammatory changes are noted. The  appendix appears normal.    The rectum appears normal.    The regional skeleton is intact      Impression    IMPRESSION: There is a calculus seen in the bladder possibly a  recently passed right ureteric calculus. There is mild dilation of the  right renal collecting system and right ureter. Left intrarenal  calculi are noted.  there is a 2 mm diameter calculus in the upper  pole of the right kidney.     MARCELINO JJ MD       Medications   0.9% sodium chloride BOLUS (has no administration in time range)     Followed by   sodium chloride 0.9% infusion (has no administration in time range)   ketorolac (TORADOL) injection 30 mg (has no administration in time range)   morphine (PF) injection 4 mg (has no administration in time range)        Assessments & Plan (with Medical Decision Making)     54-year-old male with complaints of right-sided flank pain.  History of kidney stones.  CT confirmed a stone that passed into his bladder.  Patient pain-free at this time.  Long standing history of kidney stones.  Patient has follow-up with urology next week.  Will be discharged home at this time.    Due to the nature of this electronic medical record, laboratory results, imaging results, diagnosis, other information and medications reported above may not represent information available to me at the the time of my care and disposition. Medications reported above may have not been ordered by me.     Portions of the record may have been created with voice recognition software. Occasional wrong-word or 'sound-a- like' substitution may have occurred due to the inherent limitations of voice recognition software. Though the chart has been reviewed, there may be inadvertent transcription errors. Read the chart carefully and recognize, using context, where substitutions have occurred.       New Prescriptions    No medications on file       Final diagnoses:   Kidney stone       4/18/2020   [facility]     Chandni Lund MD  04/21/20 9686

## 2021-02-17 ENCOUNTER — HOSPITAL ENCOUNTER (EMERGENCY)
Facility: HOSPITAL | Age: 56
Discharge: HOME | End: 2021-02-17
Attending: PHYSICIAN ASSISTANT | Admitting: PHYSICIAN ASSISTANT
Payer: COMMERCIAL

## 2021-02-17 ENCOUNTER — APPOINTMENT (OUTPATIENT)
Dept: CT IMAGING | Facility: HOSPITAL | Age: 56
End: 2021-02-17
Attending: PHYSICIAN ASSISTANT
Payer: COMMERCIAL

## 2021-02-17 VITALS
WEIGHT: 161 LBS | OXYGEN SATURATION: 96 % | RESPIRATION RATE: 18 BRPM | DIASTOLIC BLOOD PRESSURE: 97 MMHG | TEMPERATURE: 98.5 F | HEART RATE: 72 BPM | BODY MASS INDEX: 25.99 KG/M2 | SYSTOLIC BLOOD PRESSURE: 155 MMHG

## 2021-02-17 DIAGNOSIS — N20.0 KIDNEY STONE: ICD-10-CM

## 2021-02-17 LAB
ALBUMIN SERPL-MCNC: 4 G/DL (ref 3.4–5)
ALBUMIN UR-MCNC: 10 MG/DL
ALP SERPL-CCNC: 91 U/L (ref 40–150)
ALT SERPL W P-5'-P-CCNC: 38 U/L (ref 0–70)
ANION GAP SERPL CALCULATED.3IONS-SCNC: 4 MMOL/L (ref 3–14)
APPEARANCE UR: CLEAR
AST SERPL W P-5'-P-CCNC: 19 U/L (ref 0–45)
BACTERIA #/AREA URNS HPF: ABNORMAL /HPF
BASOPHILS # BLD AUTO: 0.1 10E9/L (ref 0–0.2)
BASOPHILS NFR BLD AUTO: 0.7 %
BILIRUB SERPL-MCNC: 0.5 MG/DL (ref 0.2–1.3)
BILIRUB UR QL STRIP: NEGATIVE
BUN SERPL-MCNC: 15 MG/DL (ref 7–30)
CALCIUM SERPL-MCNC: 9.5 MG/DL (ref 8.5–10.1)
CHLORIDE SERPL-SCNC: 106 MMOL/L (ref 94–109)
CO2 SERPL-SCNC: 28 MMOL/L (ref 20–32)
COLOR UR AUTO: YELLOW
CREAT SERPL-MCNC: 0.78 MG/DL (ref 0.66–1.25)
DIFFERENTIAL METHOD BLD: ABNORMAL
EOSINOPHIL # BLD AUTO: 0.1 10E9/L (ref 0–0.7)
EOSINOPHIL NFR BLD AUTO: 0.5 %
ERYTHROCYTE [DISTWIDTH] IN BLOOD BY AUTOMATED COUNT: 12.2 % (ref 10–15)
GFR SERPL CREATININE-BSD FRML MDRD: >90 ML/MIN/{1.73_M2}
GLUCOSE SERPL-MCNC: 173 MG/DL (ref 70–99)
GLUCOSE UR STRIP-MCNC: 150 MG/DL
HCT VFR BLD AUTO: 47.3 % (ref 40–53)
HGB BLD-MCNC: 16.8 G/DL (ref 13.3–17.7)
HGB UR QL STRIP: ABNORMAL
IMM GRANULOCYTES # BLD: 0.1 10E9/L (ref 0–0.4)
IMM GRANULOCYTES NFR BLD: 0.5 %
KETONES UR STRIP-MCNC: NEGATIVE MG/DL
LEUKOCYTE ESTERASE UR QL STRIP: NEGATIVE
LYMPHOCYTES # BLD AUTO: 2.6 10E9/L (ref 0.8–5.3)
LYMPHOCYTES NFR BLD AUTO: 19.4 %
MCH RBC QN AUTO: 32.4 PG (ref 26.5–33)
MCHC RBC AUTO-ENTMCNC: 35.5 G/DL (ref 31.5–36.5)
MCV RBC AUTO: 91 FL (ref 78–100)
MONOCYTES # BLD AUTO: 0.9 10E9/L (ref 0–1.3)
MONOCYTES NFR BLD AUTO: 7.1 %
MUCOUS THREADS #/AREA URNS LPF: PRESENT /LPF
NEUTROPHILS # BLD AUTO: 9.5 10E9/L (ref 1.6–8.3)
NEUTROPHILS NFR BLD AUTO: 71.8 %
NITRATE UR QL: NEGATIVE
NRBC # BLD AUTO: 0 10*3/UL
NRBC BLD AUTO-RTO: 0 /100
PH UR STRIP: 6.5 PH (ref 4.7–8)
PLATELET # BLD AUTO: 313 10E9/L (ref 150–450)
POTASSIUM SERPL-SCNC: 3.9 MMOL/L (ref 3.4–5.3)
PROT SERPL-MCNC: 8 G/DL (ref 6.8–8.8)
RBC # BLD AUTO: 5.19 10E12/L (ref 4.4–5.9)
RBC #/AREA URNS AUTO: >182 /HPF (ref 0–2)
SODIUM SERPL-SCNC: 138 MMOL/L (ref 133–144)
SOURCE: ABNORMAL
SP GR UR STRIP: 1.02 (ref 1–1.03)
SQUAMOUS #/AREA URNS AUTO: 0 /HPF (ref 0–1)
UROBILINOGEN UR STRIP-MCNC: NORMAL MG/DL (ref 0–2)
WBC # BLD AUTO: 13.2 10E9/L (ref 4–11)
WBC #/AREA URNS AUTO: 1 /HPF (ref 0–5)

## 2021-02-17 PROCEDURE — 81001 URINALYSIS AUTO W/SCOPE: CPT | Performed by: PHYSICIAN ASSISTANT

## 2021-02-17 PROCEDURE — 250N000011 HC RX IP 250 OP 636: Performed by: PHYSICIAN ASSISTANT

## 2021-02-17 PROCEDURE — 36415 COLL VENOUS BLD VENIPUNCTURE: CPT

## 2021-02-17 PROCEDURE — 96376 TX/PRO/DX INJ SAME DRUG ADON: CPT

## 2021-02-17 PROCEDURE — 96361 HYDRATE IV INFUSION ADD-ON: CPT

## 2021-02-17 PROCEDURE — 96375 TX/PRO/DX INJ NEW DRUG ADDON: CPT

## 2021-02-17 PROCEDURE — 99285 EMERGENCY DEPT VISIT HI MDM: CPT | Mod: 25

## 2021-02-17 PROCEDURE — 258N000003 HC RX IP 258 OP 636: Performed by: PHYSICIAN ASSISTANT

## 2021-02-17 PROCEDURE — 99284 EMERGENCY DEPT VISIT MOD MDM: CPT | Performed by: PHYSICIAN ASSISTANT

## 2021-02-17 PROCEDURE — 250N000013 HC RX MED GY IP 250 OP 250 PS 637: Performed by: PHYSICIAN ASSISTANT

## 2021-02-17 PROCEDURE — 85025 COMPLETE CBC W/AUTO DIFF WBC: CPT | Performed by: PHYSICIAN ASSISTANT

## 2021-02-17 PROCEDURE — 74176 CT ABD & PELVIS W/O CONTRAST: CPT

## 2021-02-17 PROCEDURE — 96374 THER/PROPH/DIAG INJ IV PUSH: CPT

## 2021-02-17 PROCEDURE — 80053 COMPREHEN METABOLIC PANEL: CPT | Performed by: PHYSICIAN ASSISTANT

## 2021-02-17 RX ORDER — SODIUM CHLORIDE, SODIUM LACTATE, POTASSIUM CHLORIDE, CALCIUM CHLORIDE 600; 310; 30; 20 MG/100ML; MG/100ML; MG/100ML; MG/100ML
INJECTION, SOLUTION INTRAVENOUS CONTINUOUS
Status: DISCONTINUED | OUTPATIENT
Start: 2021-02-17 | End: 2021-02-17 | Stop reason: HOSPADM

## 2021-02-17 RX ORDER — ACETAMINOPHEN 325 MG/1
650 TABLET ORAL EVERY 4 HOURS PRN
Status: DISCONTINUED | OUTPATIENT
Start: 2021-02-17 | End: 2021-02-17 | Stop reason: HOSPADM

## 2021-02-17 RX ORDER — MORPHINE SULFATE 2 MG/ML
2 INJECTION, SOLUTION INTRAMUSCULAR; INTRAVENOUS
Status: DISCONTINUED | OUTPATIENT
Start: 2021-02-17 | End: 2021-02-17 | Stop reason: HOSPADM

## 2021-02-17 RX ORDER — MORPHINE SULFATE 2 MG/ML
2 INJECTION, SOLUTION INTRAMUSCULAR; INTRAVENOUS EVERY 6 HOURS PRN
Status: DISCONTINUED | OUTPATIENT
Start: 2021-02-17 | End: 2021-02-17

## 2021-02-17 RX ORDER — ONDANSETRON 2 MG/ML
4 INJECTION INTRAMUSCULAR; INTRAVENOUS
Status: COMPLETED | OUTPATIENT
Start: 2021-02-17 | End: 2021-02-17

## 2021-02-17 RX ORDER — KETOROLAC TROMETHAMINE 30 MG/ML
30 INJECTION, SOLUTION INTRAMUSCULAR; INTRAVENOUS ONCE
Status: COMPLETED | OUTPATIENT
Start: 2021-02-17 | End: 2021-02-17

## 2021-02-17 RX ADMIN — KETOROLAC TROMETHAMINE 30 MG: 30 INJECTION, SOLUTION INTRAMUSCULAR at 14:25

## 2021-02-17 RX ADMIN — ACETAMINOPHEN 650 MG: 325 TABLET, FILM COATED ORAL at 13:38

## 2021-02-17 RX ADMIN — ONDANSETRON 4 MG: 2 INJECTION INTRAMUSCULAR; INTRAVENOUS at 13:37

## 2021-02-17 RX ADMIN — SODIUM CHLORIDE, POTASSIUM CHLORIDE, SODIUM LACTATE AND CALCIUM CHLORIDE 1000 ML: 600; 310; 30; 20 INJECTION, SOLUTION INTRAVENOUS at 13:37

## 2021-02-17 RX ADMIN — MORPHINE SULFATE 2 MG: 2 INJECTION, SOLUTION INTRAMUSCULAR; INTRAVENOUS at 15:32

## 2021-02-17 RX ADMIN — SODIUM CHLORIDE, POTASSIUM CHLORIDE, SODIUM LACTATE AND CALCIUM CHLORIDE 1000 ML: 600; 310; 30; 20 INJECTION, SOLUTION INTRAVENOUS at 15:27

## 2021-02-17 RX ADMIN — MORPHINE SULFATE 2 MG: 2 INJECTION, SOLUTION INTRAMUSCULAR; INTRAVENOUS at 13:38

## 2021-02-17 RX ADMIN — SODIUM CHLORIDE, POTASSIUM CHLORIDE, SODIUM LACTATE AND CALCIUM CHLORIDE: 600; 310; 30; 20 INJECTION, SOLUTION INTRAVENOUS at 16:33

## 2021-02-17 ASSESSMENT — ENCOUNTER SYMPTOMS
ACTIVITY CHANGE: 1
CHILLS: 0
NAUSEA: 0
RESPIRATORY NEGATIVE: 1
DIARRHEA: 0
FEVER: 0
FREQUENCY: 0
ABDOMINAL PAIN: 1
CONSTIPATION: 0
ABDOMINAL DISTENTION: 0
VOMITING: 0
BACK PAIN: 1
DYSURIA: 0
FLANK PAIN: 1
DIFFICULTY URINATING: 0
CARDIOVASCULAR NEGATIVE: 1

## 2021-02-17 NOTE — ED PROVIDER NOTES
History     Chief Complaint   Patient presents with     Flank Pain     The history is provided by the patient.     Wesley Kim is a 55 year old male, with a PMH significant for extensive renal calculi who presents with right flank pain.  He states it is similar to his prior events and is confident that this is a stone again.  He states it is in the right flank and does radiate somewhat to the right lower quadrant.  He has been eating ok, normal BMs, no urinary symptoms.      Allergies:  Allergies   Allergen Reactions     Codeine Nausea     Tegretol [Carbamazepine]      Lost vision         Problem List:    Patient Active Problem List    Diagnosis Date Noted     Renal colic 10/14/2015     Priority: Medium     Nephrolithiasis 10/14/2015     Priority: Medium        Past Medical History:    Past Medical History:   Diagnosis Date     Diabetes (H)      Epidermolysis bullosa      Peripheral neuropathy      Recurrent kidney stones        Past Surgical History:    Past Surgical History:   Procedure Laterality Date     GENITOURINARY SURGERY      14 procedures for stones       Family History:    Family History   Problem Relation Age of Onset     Thyroid Disease Mother      Hypertension Father      Hypertension Brother        Social History:  Marital Status:   [2]  Social History     Tobacco Use     Smoking status: Current Every Day Smoker     Packs/day: 0.50     Years: 28.00     Pack years: 14.00     Smokeless tobacco: Never Used   Substance Use Topics     Alcohol use: No     Drug use: No        Medications:    aspirin 81 MG tablet  losartan (COZAAR) 50 MG tablet  METFORMIN HCL PO      Review of Systems   Constitutional: Positive for activity change. Negative for chills and fever.   HENT: Negative.    Respiratory: Negative.    Cardiovascular: Negative.    Gastrointestinal: Positive for abdominal pain. Negative for abdominal distention, constipation, diarrhea, nausea and vomiting.   Genitourinary: Positive for flank  pain. Negative for difficulty urinating, dysuria and frequency.   Musculoskeletal: Positive for back pain.       Physical Exam   BP: (!) 168/103  Pulse: 92  Temp: 98.6  F (37  C)  Resp: 18  Weight: 73 kg (161 lb)  SpO2: 100 %      Physical Exam  Constitutional:       General: He is not in acute distress.     Appearance: Normal appearance. He is not ill-appearing, toxic-appearing or diaphoretic.   Cardiovascular:      Rate and Rhythm: Normal rate.      Heart sounds: Normal heart sounds.   Pulmonary:      Effort: No respiratory distress.      Breath sounds: Normal breath sounds.   Abdominal:      General: Abdomen is flat.      Palpations: Abdomen is soft.      Tenderness: There is abdominal tenderness. There is right CVA tenderness.      Comments: Mild RLQ tenderness   Skin:     General: Skin is warm.   Neurological:      Mental Status: He is alert.   Psychiatric:         Mood and Affect: Mood normal.         ED Course     Results for orders placed or performed during the hospital encounter of 02/17/21 (from the past 24 hour(s))   CBC with platelets differential   Result Value Ref Range    WBC 13.2 (H) 4.0 - 11.0 10e9/L    RBC Count 5.19 4.4 - 5.9 10e12/L    Hemoglobin 16.8 13.3 - 17.7 g/dL    Hematocrit 47.3 40.0 - 53.0 %    MCV 91 78 - 100 fl    MCH 32.4 26.5 - 33.0 pg    MCHC 35.5 31.5 - 36.5 g/dL    RDW 12.2 10.0 - 15.0 %    Platelet Count 313 150 - 450 10e9/L    Diff Method Automated Method     % Neutrophils 71.8 %    % Lymphocytes 19.4 %    % Monocytes 7.1 %    % Eosinophils 0.5 %    % Basophils 0.7 %    % Immature Granulocytes 0.5 %    Nucleated RBCs 0 0 /100    Absolute Neutrophil 9.5 (H) 1.6 - 8.3 10e9/L    Absolute Lymphocytes 2.6 0.8 - 5.3 10e9/L    Absolute Monocytes 0.9 0.0 - 1.3 10e9/L    Absolute Eosinophils 0.1 0.0 - 0.7 10e9/L    Absolute Basophils 0.1 0.0 - 0.2 10e9/L    Abs Immature Granulocytes 0.1 0 - 0.4 10e9/L    Absolute Nucleated RBC 0.0    Comprehensive metabolic panel   Result Value Ref  Range    Sodium 138 133 - 144 mmol/L    Potassium 3.9 3.4 - 5.3 mmol/L    Chloride 106 94 - 109 mmol/L    Carbon Dioxide 28 20 - 32 mmol/L    Anion Gap 4 3 - 14 mmol/L    Glucose 173 (H) 70 - 99 mg/dL    Urea Nitrogen 15 7 - 30 mg/dL    Creatinine 0.78 0.66 - 1.25 mg/dL    GFR Estimate >90 >60 mL/min/[1.73_m2]    GFR Estimate If Black >90 >60 mL/min/[1.73_m2]    Calcium 9.5 8.5 - 10.1 mg/dL    Bilirubin Total 0.5 0.2 - 1.3 mg/dL    Albumin 4.0 3.4 - 5.0 g/dL    Protein Total 8.0 6.8 - 8.8 g/dL    Alkaline Phosphatase 91 40 - 150 U/L    ALT 38 0 - 70 U/L    AST 19 0 - 45 U/L   CT Abdomen Pelvis w/o Contrast    Narrative    EXAMINATION: CT ABDOMEN PELVIS W/O CONTRAST, 2/17/2021 1:54 PM    TECHNIQUE:  Helical CT images from the lung bases through the  symphysis pubis were obtained  without IV contrast. Contrast dose:    COMPARISON: April 2020    HISTORY: Flank pain, kidney stone suspected    FINDINGS:    The lung bases are clear    The liver is free of masses or biliary ductal enlargement. Fatty  infiltration of the liver is noted. No calcified gallstones are seen.    The the spleen and pancreas appear normal.    The adrenal glands are normal.    There are multiple small intrarenal calculi bilaterally the largest on  the right measures 3 mm. The largest on the left measures 9 mm. There  is dilation of the right renal collecting system and right ureter. At  the level of iliac vessel crossing on the right there is a 3 mm in  diameter right ureteric calculus.    The periaortic lymph nodes are normal in caliber.    No intraperitoneal masses or inflammatory changes are noted.    In the pelvis the bladder and rectum appear normal.    Degenerative changes are present in the lower thoracic and upper  lumbar spine. Arthritic changes are seen in both hips.      Impression    IMPRESSION: 3 mm right ureteric calculus at the level of the iliac  vessel crossing. Bilateral intrarenal calculi     MARCELINO JJ MD   UA reflex to  Microscopic   Result Value Ref Range    Color Urine Yellow     Appearance Urine Clear     Glucose Urine 150 (A) NEG^Negative mg/dL    Bilirubin Urine Negative NEG^Negative    Ketones Urine Negative NEG^Negative mg/dL    Specific Gravity Urine 1.024 1.003 - 1.035    Blood Urine Large (A) NEG^Negative    pH Urine 6.5 4.7 - 8.0 pH    Protein Albumin Urine 10 (A) NEG^Negative mg/dL    Urobilinogen mg/dL Normal 0.0 - 2.0 mg/dL    Nitrite Urine Negative NEG^Negative    Leukocyte Esterase Urine Negative NEG^Negative    Source Midstream Urine     RBC Urine >182 (H) 0 - 2 /HPF    WBC Urine 1 0 - 5 /HPF    Bacteria Urine None (A) NEG^Negative /HPF    Squamous Epithelial /HPF Urine 0 0 - 1 /HPF    Mucous Urine Present (A) NEG^Negative /LPF       Medications   lactated ringers BOLUS 1,000 mL (0 mLs Intravenous Stopped 2/17/21 1528)     Followed by   lactated ringers infusion (has no administration in time range)   acetaminophen (TYLENOL) tablet 650 mg (650 mg Oral Given 2/17/21 1338)   lactated ringers BOLUS 1,000 mL (1,000 mLs Intravenous New Bag 2/17/21 1527)   morphine (PF) injection 2 mg (2 mg Intravenous Given 2/17/21 1532)   ondansetron (ZOFRAN) injection 4 mg (4 mg Intravenous Given 2/17/21 1337)   ketorolac (TORADOL) injection 30 mg (30 mg Intravenous Given 2/17/21 1425)     Assessments & Plan (with Medical Decision Making)     I have reviewed the nursing notes.    I have reviewed the findings, diagnosis, plan and need for follow up with the patient.    New Prescriptions    No medications on file       Final diagnoses:   Kidney stone   54 yo male with renal calculi, which is frequent for him.  He was given two LR boluses in the ER to pass his stone along with pain medications.  He was re-evaluated later in his stay and felt much better. The pain had resolved.  He wasn't sure that he passed the stone yet, but was confident that he could manage at home.  He voiced that he would return should his symptoms worsen.       2/17/2021   [facility]     Brandon Frausto PA-C  02/17/21 0185

## 2021-02-17 NOTE — ED NOTES
Pt comes into ED today due to lengthy hx of kidney stones. States he knows that that is what it is. States the pain on his right groin/RLQ and right flank started on Monday but went away for awhile, but is here due to it returning. States a little fluid with flush them out. Denies any issues while voiding. Has been nauseas but no emesis. Monitors and iv placed.

## 2021-02-17 NOTE — ED TRIAGE NOTES
"\"Here for right groin pain and right flank pain.  I have a hx of kidney stones with multiple surgeries for them.\"  "

## 2021-02-17 NOTE — Clinical Note
Wesley Kim was seen and treated in our emergency department on 2/17/2021.  He may return to work on 02/19/2021.  Patient was seen in the ER today and will likely require tomorrow off from work.       If you have any questions or concerns, please don't hesitate to call.      Brandon Frausto PA-C

## 2021-02-17 NOTE — ED NOTES
Patient education and knows how to strain his urine.  Strainer given to patient earlier by a different nurse.

## 2021-03-17 ENCOUNTER — IMMUNIZATION (OUTPATIENT)
Dept: FAMILY MEDICINE | Facility: OTHER | Age: 56
End: 2021-03-17
Attending: FAMILY MEDICINE
Payer: COMMERCIAL

## 2021-03-17 PROCEDURE — 91300 PR COVID VAC PFIZER DIL RECON 30 MCG/0.3 ML IM: CPT

## 2021-03-17 PROCEDURE — 0001A PR COVID VAC PFIZER DIL RECON 30 MCG/0.3 ML IM: CPT

## 2021-04-05 ENCOUNTER — IMMUNIZATION (OUTPATIENT)
Dept: FAMILY MEDICINE | Facility: OTHER | Age: 56
End: 2021-04-05
Attending: FAMILY MEDICINE
Payer: COMMERCIAL

## 2021-04-05 PROCEDURE — 0002A PR COVID VAC PFIZER DIL RECON 30 MCG/0.3 ML IM: CPT

## 2021-04-05 PROCEDURE — 91300 PR COVID VAC PFIZER DIL RECON 30 MCG/0.3 ML IM: CPT

## 2023-04-17 LAB
ALT SERPL-CCNC: 23 U/L (ref 18–65)
AST SERPL-CCNC: 24 U/L (ref 10–40)
CREATININE (EXTERNAL): 0.74 MG/DL (ref 0.8–1.5)
GFR ESTIMATED (EXTERNAL): >90 ML/MIN/1.73M2
GLUCOSE (EXTERNAL): 120 MG/DL (ref 60–99)
POTASSIUM (EXTERNAL): 4.1 MMOL/L (ref 3.5–5.1)

## 2023-07-13 LAB — HBA1C MFR BLD: 6.3 %

## 2023-09-15 LAB
ALT SERPL-CCNC: 25 U/L (ref 18–65)
AST SERPL-CCNC: 29 U/L (ref 10–40)
CREATININE (EXTERNAL): 0.71 MG/DL (ref 0.8–1.5)
GFR ESTIMATED (EXTERNAL): >90 ML/MIN/1.73M2
GLUCOSE (EXTERNAL): 119 MG/DL (ref 60–99)
POTASSIUM (EXTERNAL): 4.8 MMOL/L (ref 3.5–5.1)

## 2023-09-28 ENCOUNTER — APPOINTMENT (OUTPATIENT)
Dept: ULTRASOUND IMAGING | Facility: HOSPITAL | Age: 58
End: 2023-09-28
Attending: STUDENT IN AN ORGANIZED HEALTH CARE EDUCATION/TRAINING PROGRAM
Payer: COMMERCIAL

## 2023-09-28 ENCOUNTER — HOSPITAL ENCOUNTER (EMERGENCY)
Facility: HOSPITAL | Age: 58
Discharge: HOME | End: 2023-09-28
Attending: STUDENT IN AN ORGANIZED HEALTH CARE EDUCATION/TRAINING PROGRAM | Admitting: STUDENT IN AN ORGANIZED HEALTH CARE EDUCATION/TRAINING PROGRAM
Payer: COMMERCIAL

## 2023-09-28 VITALS
SYSTOLIC BLOOD PRESSURE: 159 MMHG | BODY MASS INDEX: 21.9 KG/M2 | HEART RATE: 77 BPM | OXYGEN SATURATION: 98 % | WEIGHT: 135.69 LBS | RESPIRATION RATE: 18 BRPM | DIASTOLIC BLOOD PRESSURE: 85 MMHG | TEMPERATURE: 97.6 F

## 2023-09-28 DIAGNOSIS — R60.0 EDEMA OF LEFT LOWER EXTREMITY: ICD-10-CM

## 2023-09-28 PROCEDURE — 99284 EMERGENCY DEPT VISIT MOD MDM: CPT | Mod: 25

## 2023-09-28 PROCEDURE — 93971 EXTREMITY STUDY: CPT | Mod: LT

## 2023-09-28 PROCEDURE — 99283 EMERGENCY DEPT VISIT LOW MDM: CPT | Performed by: STUDENT IN AN ORGANIZED HEALTH CARE EDUCATION/TRAINING PROGRAM

## 2023-09-28 RX ORDER — FUROSEMIDE 20 MG
TABLET ORAL
COMMUNITY
Start: 2023-09-15

## 2023-09-28 ASSESSMENT — ACTIVITIES OF DAILY LIVING (ADL): ADLS_ACUITY_SCORE: 35

## 2023-09-28 NOTE — ED TRIAGE NOTES
Patient presents with complaints of left leg swelling states that he was started on lasix, but doesn't feel like it's getting better and it's also stating he has some numbness on the outer though of his left leg. Staes he has some burning sensations from his knees to his feet.

## 2023-09-28 NOTE — DISCHARGE INSTRUCTIONS
It was a pleasure taking care of you today. We saw you for left leg swelling. We recommend that you use compression stockings for your symptoms.  The official read on the ultrasound was negative for DVT    Please follow up with your primary care provider in 1-2 weeks for a recheck. Please return to the emergency department if you develop symptoms like worsening pain, fever, redness around your leg, shortness of breath, cough, or if your symptoms persist or get worse. Take care. Feel better.

## 2023-09-28 NOTE — ED NOTES
Patient resting in position of comfort.   No needs at this time.   Call light within reach.   Awaiting US results.

## 2023-09-28 NOTE — ED PROVIDER NOTES
[facility] [place]  ED Provider Note    Chief Complaint   Patient presents with    Leg Swelling     History:  Wesley Kim is a 58 year old male with epicondylosis bullosa, chronic pain syndrome, and nonspecific scarring of the hands and feet presenting with concerns regarding left leg pain and swelling.  He reports that it has been occurring over the past couple of weeks.  He has been prescribed and as needed Lasix to take for the swelling, but reports that the pain has been progressive.  He called the nurse line earlier today, which precipitated his visit.    Nurse line suggested that he go to the emergency department for further evaluation of a blood clot.  He denies any chest pain, or shortness of breath.  He denies any history of blood clots.  She denies any recent cough, coughing up blood, or issues sleeping.  He reports that the right leg has been affected in his foot, but this has been an ongoing condition, and he is unsure as to whether or not it is related to his epidermal lysis bullosa.  He denies any recent fevers or chills.  No recent redness around his leg.    Review of Systems   Performed; see HPI for pertinent positives and negatives.     Medical history, surgical history, and social history was reviewed.  Nursing documentation, triage note, and vitals were reviewed.    Vitals:  BP: 136/80  Pulse: 76  Temp: 97.6  F (36.4  C)  Resp: 18  Weight: 61.5 kg (135 lb 11.1 oz)  SpO2: 98 %    Physical Exam:  General: Well-appearing, nontoxic  Head: No trauma.  ENT: Moist mucosa.  Neck: Spontaneous range of motion.  Cardiovascular: 2+ DP pulses bilaterally.  Regular rate and rhythm.  Pulmonary: Unlabored respirations, clear to auscultation.  Abdomen: Soft, nontender.  Extremities: There is trace pitting edema noted in the left anterior pretibial leg.  Otherwise warm and well perfused, without any bony deformity.  Skin: There is some skin breakdown overlying the left anterior leg.  Chronic changes  over the palmar surface of the hands, and soles of the feet.  No areas of erythema, or streaking. No crepitus.  Neuro: Alert and appropriate.  Moves all 4 extremity spontaneously and equally.    MDM:  In summary, this a 58-year-old male who is presenting for concerns regarding left lower extremity swelling and pain.  He has calf tenderness on arrival.  His vital signs are reassuring, he is without hypoxia or tachycardia.    Discussed with the patient the plan for ultrasound to further evaluate for any DVT of the leg.  He has no renu evidence of cellulitis at this time.  I do not suspect blood work or radiographs are indicated at this time.  We will plan for ultrasound, determination of disposition.    Patient may benefit from compression stockings in addition to the as needed Lasix as well as primary care follow-up.  Discussed with the patient this plan and he was agreeable.  Patient's pain is minimal, and no indication for symptomatic treatment at this time.  All question concerns otherwise addressed and answered.    ED Course as of 09/28/23 0804   Thu Sep 28, 2023   0707 SOR with concern for DVT, pending    0802 No DVT. Pt examined. Reassuring. Pt updated. Discharged in stable condition with all questions answered, return precautions given       Impression:  Final diagnoses:   Edema of left lower extremity        Matias Jj MD  09/28/23 0700       Lee Wells MD  09/28/23 0805

## 2023-09-28 NOTE — ED NOTES
Discharge instructions reviewed. Verbalized understanding. Reports he does have PRN furosemide at home that he plans to take until he sees his PCP. Denies any questions or concerns. Ambulated out of ER independently with steady gait.

## 2024-01-28 ENCOUNTER — HOSPITAL ENCOUNTER (EMERGENCY)
Facility: HOSPITAL | Age: 59
Discharge: HOME | End: 2024-01-28
Attending: PHYSICIAN ASSISTANT | Admitting: PHYSICIAN ASSISTANT
Payer: COMMERCIAL

## 2024-01-28 VITALS
SYSTOLIC BLOOD PRESSURE: 162 MMHG | DIASTOLIC BLOOD PRESSURE: 84 MMHG | OXYGEN SATURATION: 98 % | RESPIRATION RATE: 18 BRPM | TEMPERATURE: 97.4 F | HEART RATE: 95 BPM

## 2024-01-28 DIAGNOSIS — M79.605 CHRONIC PAIN OF LEFT LOWER EXTREMITY: ICD-10-CM

## 2024-01-28 DIAGNOSIS — G89.29 CHRONIC PAIN OF LEFT LOWER EXTREMITY: ICD-10-CM

## 2024-01-28 PROCEDURE — G0463 HOSPITAL OUTPT CLINIC VISIT: HCPCS

## 2024-01-28 PROCEDURE — 99213 OFFICE O/P EST LOW 20 MIN: CPT | Performed by: PHYSICIAN ASSISTANT

## 2024-01-28 ASSESSMENT — ACTIVITIES OF DAILY LIVING (ADL): ADLS_ACUITY_SCORE: 35

## 2024-01-28 NOTE — ED PROVIDER NOTES
History     Chief Complaint   Patient presents with    Leg Pain     HPI  Wesley Kim is a 58 year old male who epicondylosis bullosa, chronic pain syndrome, and nonspecific scarring of the hands and feet presenting with concerns regarding left leg pain.  Patient's had chronic leg pain to this area now for 30 years.  Patient's noticed approximately 18 months ago he injured it on a trailer and since that time he has had some increased discomfort of the areas noted increased scarring around the area.  This is felt to be all secondary related to his epicondylosis bullosa.  He is here today concerned that he may need some Kenalog injection and and Rocephin.  He has had no fevers chills no nausea vomiting diarrhea no weeping.  Patient has noted it has been more difficult to dorsiflex the ankle joint.  Allergies:  Allergies   Allergen Reactions    Adhesive Tape Other (See Comments)     Tears skin    Amitriptyline      sedation    Codeine Nausea    Gabapentin      Temporary lost eyesight    Morphine And Related Nausea    Tegretol [Carbamazepine]      Lost vision         Problem List:    Patient Active Problem List    Diagnosis Date Noted    Renal colic 10/14/2015     Priority: Medium    Nephrolithiasis 10/14/2015     Priority: Medium        Past Medical History:    Past Medical History:   Diagnosis Date    Diabetes (H)     Epidermolysis bullosa     Peripheral neuropathy     Recurrent kidney stones        Past Surgical History:    Past Surgical History:   Procedure Laterality Date    GENITOURINARY SURGERY      14 procedures for stones       Family History:    Family History   Problem Relation Age of Onset    Thyroid Disease Mother     Hypertension Father     Hypertension Brother        Social History:  Marital Status:   [2]  Social History     Tobacco Use    Smoking status: Every Day     Packs/day: 1.00     Years: 28.00     Additional pack years: 0.00     Total pack years: 28.00     Types: Cigarettes    Smokeless  tobacco: Never   Substance Use Topics    Alcohol use: Yes     Comment: occasionally    Drug use: No        Medications:    furosemide (LASIX) 20 MG tablet  losartan (COZAAR) 50 MG tablet  METFORMIN HCL PO          Review of Systems   All other systems reviewed and are negative.      Physical Exam   BP: 162/84  Pulse: 95  Temp: 97.4  F (36.3  C)  Resp: 18  SpO2: 98 %      Physical Exam  Constitutional:       General: He is not in acute distress.     Appearance: Normal appearance. He is normal weight. He is not ill-appearing, toxic-appearing or diaphoretic.   HENT:      Head: Normocephalic and atraumatic.      Right Ear: External ear normal.      Left Ear: External ear normal.   Eyes:      Extraocular Movements: Extraocular movements intact.      Conjunctiva/sclera: Conjunctivae normal.      Pupils: Pupils are equal, round, and reactive to light.   Cardiovascular:      Rate and Rhythm: Normal rate.   Pulmonary:      Effort: Pulmonary effort is normal. No respiratory distress.   Musculoskeletal:         General: Normal range of motion.   Skin:     General: Skin is warm and dry.      Coloration: Skin is not jaundiced or pale.   Neurological:      Mental Status: He is alert and oriented to person, place, and time. Mental status is at baseline.      Cranial Nerves: No cranial nerve deficit.   Psychiatric:         Mood and Affect: Mood normal.         ED Course      I have reviewed the epic chart, the nurses note and triage note. vital signs were reviewed.  Discussed with the patient recommendations will be to place him in a static boot.  Patient has concerns secondary to easily blistering due to his condition.  I explained my considerations with him regarding this I think he needs to have close follow-up with his primary has not unable to give him Kenalog here in the department as we do not carry it nor did I have any previous experience with this medical condition and whether or not Kenalog would be an appropriate  therapy.  He is recommended to follow-up with his primary care doctor.           Procedures                  No results found for this or any previous visit (from the past 24 hour(s)).    Medications - No data to display    Assessments & Plan (with Medical Decision Making)     I have reviewed the nursing notes.    I have reviewed the findings, diagnosis, plan and need for follow up with the patient.        Discharge Medication List as of 1/28/2024  9:15 AM          Final diagnoses:   Chronic pain of left lower extremity       1/28/2024   [facility]       Kt Reardon PA-C  01/28/24 0694

## 2024-02-05 LAB
ALBUMIN (URINE) MG/SPEC: 19 MG/L
ALBUMIN/CREATININE RATIO: 36 (ref 0–30)
ALT SERPL-CCNC: 17 U/L (ref 18–65)
AST SERPL-CCNC: 23 U/L (ref 10–40)
CHOLESTEROL (EXTERNAL): 223 MG/DL
CREATININE (EXTERNAL): 0.57 MG/DL (ref 0.8–1.5)
CREATININE (URINE): 53.3 MG/DL
GFR ESTIMATED (EXTERNAL): >90 ML/MIN/1.73M2
GLUCOSE (EXTERNAL): 141 MG/DL (ref 60–99)
HDLC SERPL-MCNC: 73 MG/DL
LDL CHOLESTEROL CALCULATED (EXTERNAL): 130 MG/DL
POTASSIUM (EXTERNAL): 4.2 MMOL/L (ref 3.5–5.1)
TRIGLYCERIDES (EXTERNAL): 99 MG/DL
TSH SERPL-ACNC: 1.89 UIU/ML (ref 0.35–4.8)

## 2024-04-01 ENCOUNTER — MEDICAL CORRESPONDENCE (OUTPATIENT)
Dept: NUCLEAR MEDICINE | Facility: HOSPITAL | Age: 59
End: 2024-04-01

## 2024-04-08 ENCOUNTER — TELEPHONE (OUTPATIENT)
Dept: NUCLEAR MEDICINE | Facility: HOSPITAL | Age: 59
End: 2024-04-08

## 2024-04-08 NOTE — TELEPHONE ENCOUNTER
Appointment Reminder Call    General voicemail left with [facility] and scheduling hours and phone numbers.   No

## 2024-04-09 ENCOUNTER — HOSPITAL ENCOUNTER (OUTPATIENT)
Dept: NUCLEAR MEDICINE | Facility: HOSPITAL | Age: 59
Setting detail: NUCLEAR MEDICINE
Discharge: HOME | End: 2024-04-09
Attending: NURSE PRACTITIONER
Payer: COMMERCIAL

## 2024-04-09 ENCOUNTER — HOSPITAL ENCOUNTER (OUTPATIENT)
Dept: CARDIOLOGY | Facility: HOSPITAL | Age: 59
Setting detail: NUCLEAR MEDICINE
Discharge: HOME | End: 2024-04-09
Attending: NURSE PRACTITIONER
Payer: COMMERCIAL

## 2024-04-09 DIAGNOSIS — R07.9 CHEST PAIN: ICD-10-CM

## 2024-04-09 LAB
CV BLOOD PRESSURE: 73 MMHG
CV STRESS MAX HR HE: 97
NUC STRESS EJECTION FRACTION: 80 %
RATE PRESSURE PRODUCT: NORMAL
STRESS ECHO BASELINE DIASTOLIC HE: 78
STRESS ECHO BASELINE HR: 59 BPM
STRESS ECHO BASELINE SYSTOLIC BP: 140
STRESS ECHO CALCULATED PERCENT HR: 60 %
STRESS ECHO LAST STRESS DIASTOLIC BP: 68
STRESS ECHO LAST STRESS SYSTOLIC BP: 154
STRESS ECHO TARGET HR: 163

## 2024-04-09 PROCEDURE — 343N000001 HC RX 343: Performed by: RADIOLOGY

## 2024-04-09 PROCEDURE — A9500 TC99M SESTAMIBI: HCPCS | Performed by: RADIOLOGY

## 2024-04-09 PROCEDURE — 250N000011 HC RX IP 250 OP 636: Mod: JZ | Performed by: INTERNAL MEDICINE

## 2024-04-09 PROCEDURE — 93016 CV STRESS TEST SUPVJ ONLY: CPT | Performed by: INTERNAL MEDICINE

## 2024-04-09 PROCEDURE — 78452 HT MUSCLE IMAGE SPECT MULT: CPT

## 2024-04-09 PROCEDURE — 93017 CV STRESS TEST TRACING ONLY: CPT

## 2024-04-09 PROCEDURE — 93018 CV STRESS TEST I&R ONLY: CPT | Performed by: INTERNAL MEDICINE

## 2024-04-09 RX ORDER — REGADENOSON 0.08 MG/ML
0.4 INJECTION, SOLUTION INTRAVENOUS ONCE
Status: COMPLETED | OUTPATIENT
Start: 2024-04-09 | End: 2024-04-09

## 2024-04-09 RX ORDER — AMINOPHYLLINE 25 MG/ML
INJECTION, SOLUTION INTRAVENOUS
Status: DISCONTINUED
Start: 2024-04-09 | End: 2024-04-09 | Stop reason: WASHOUT

## 2024-04-09 RX ADMIN — Medication 32.1 MILLICURIE: at 08:37

## 2024-04-09 RX ADMIN — Medication 10 MILLICURIE: at 06:44

## 2024-04-09 RX ADMIN — REGADENOSON 0.4 MG: 0.08 INJECTION, SOLUTION INTRAVENOUS at 08:47

## 2024-05-13 LAB
ALT SERPL-CCNC: 25 U/L (ref 18–65)
AST SERPL-CCNC: 25 U/L (ref 10–40)
CREATININE (EXTERNAL): 0.68 MG/DL (ref 0.8–1.5)
GFR ESTIMATED (EXTERNAL): >90 ML/MIN/1.73M2
GLUCOSE (EXTERNAL): 91 MG/DL (ref 70–100)
HBA1C MFR BLD: 5.5 %
PHQ9 SCORE: 3
POTASSIUM (EXTERNAL): 4.3 MMOL/L (ref 3.5–5.1)

## 2024-05-24 ENCOUNTER — TRANSFERRED RECORDS (OUTPATIENT)
Dept: HEALTH INFORMATION MANAGEMENT | Facility: CLINIC | Age: 59
End: 2024-05-24

## 2024-06-18 ENCOUNTER — HOSPITAL ENCOUNTER (EMERGENCY)
Facility: HOSPITAL | Age: 59
Discharge: HOME | End: 2024-06-18
Attending: EMERGENCY MEDICINE | Admitting: EMERGENCY MEDICINE
Payer: COMMERCIAL

## 2024-06-18 VITALS
HEART RATE: 77 BPM | BODY MASS INDEX: 20.09 KG/M2 | TEMPERATURE: 98.1 F | HEIGHT: 66 IN | SYSTOLIC BLOOD PRESSURE: 166 MMHG | RESPIRATION RATE: 18 BRPM | WEIGHT: 125 LBS | DIASTOLIC BLOOD PRESSURE: 84 MMHG | OXYGEN SATURATION: 97 %

## 2024-06-18 DIAGNOSIS — M43.6 ACUTE MUSCLE STIFFNESS OF NECK: ICD-10-CM

## 2024-06-18 LAB
ALBUMIN SERPL BCG-MCNC: 3.7 G/DL (ref 3.5–5.2)
ALP SERPL-CCNC: 74 U/L (ref 40–150)
ALT SERPL W P-5'-P-CCNC: 16 U/L (ref 0–70)
ANION GAP SERPL CALCULATED.3IONS-SCNC: 10 MMOL/L (ref 7–15)
AST SERPL W P-5'-P-CCNC: 21 U/L (ref 0–45)
BASOPHILS # BLD AUTO: 0.1 10E3/UL (ref 0–0.2)
BASOPHILS NFR BLD AUTO: 1 %
BILIRUB DIRECT SERPL-MCNC: <0.2 MG/DL (ref 0–0.3)
BILIRUB SERPL-MCNC: 0.4 MG/DL
BUN SERPL-MCNC: 12.9 MG/DL (ref 6–20)
CALCIUM SERPL-MCNC: 8.1 MG/DL (ref 8.6–10)
CHLORIDE SERPL-SCNC: 109 MMOL/L (ref 98–107)
CREAT SERPL-MCNC: 0.58 MG/DL (ref 0.67–1.17)
DEPRECATED HCO3 PLAS-SCNC: 23 MMOL/L (ref 22–29)
EGFRCR SERPLBLD CKD-EPI 2021: >90 ML/MIN/1.73M2
EOSINOPHIL # BLD AUTO: 0.1 10E3/UL (ref 0–0.7)
EOSINOPHIL NFR BLD AUTO: 1 %
ERYTHROCYTE [DISTWIDTH] IN BLOOD BY AUTOMATED COUNT: 11.9 % (ref 10–15)
GLUCOSE SERPL-MCNC: 79 MG/DL (ref 70–99)
HCT VFR BLD AUTO: 48.2 % (ref 40–53)
HGB BLD-MCNC: 17 G/DL (ref 13.3–17.7)
HOLD SPECIMEN: NORMAL
IMM GRANULOCYTES # BLD: 0.1 10E3/UL
IMM GRANULOCYTES NFR BLD: 1 %
INR PPP: 1 (ref 0.85–1.15)
LYMPHOCYTES # BLD AUTO: 1.6 10E3/UL (ref 0.8–5.3)
LYMPHOCYTES NFR BLD AUTO: 18 %
MCH RBC QN AUTO: 34.6 PG (ref 26.5–33)
MCHC RBC AUTO-ENTMCNC: 35.3 G/DL (ref 31.5–36.5)
MCV RBC AUTO: 98 FL (ref 78–100)
MONOCYTES # BLD AUTO: 0.8 10E3/UL (ref 0–1.3)
MONOCYTES NFR BLD AUTO: 8 %
NEUTROPHILS # BLD AUTO: 6.6 10E3/UL (ref 1.6–8.3)
NEUTROPHILS NFR BLD AUTO: 72 %
NRBC # BLD AUTO: 0 10E3/UL
NRBC BLD AUTO-RTO: 0 /100
PLATELET # BLD AUTO: 328 10E3/UL (ref 150–450)
POTASSIUM SERPL-SCNC: 4.1 MMOL/L (ref 3.4–5.3)
PROT SERPL-MCNC: 6 G/DL (ref 6.4–8.3)
RBC # BLD AUTO: 4.92 10E6/UL (ref 4.4–5.9)
SODIUM SERPL-SCNC: 142 MMOL/L (ref 135–145)
WBC # BLD AUTO: 9.2 10E3/UL (ref 4–11)

## 2024-06-18 PROCEDURE — 80053 COMPREHEN METABOLIC PANEL: CPT | Performed by: EMERGENCY MEDICINE

## 2024-06-18 PROCEDURE — 99284 EMERGENCY DEPT VISIT MOD MDM: CPT | Mod: 25

## 2024-06-18 PROCEDURE — 250N000011 HC RX IP 250 OP 636: Mod: JZ | Performed by: EMERGENCY MEDICINE

## 2024-06-18 PROCEDURE — 258N000003 HC RX IP 258 OP 636: Mod: JZ | Performed by: EMERGENCY MEDICINE

## 2024-06-18 PROCEDURE — 99284 EMERGENCY DEPT VISIT MOD MDM: CPT | Performed by: EMERGENCY MEDICINE

## 2024-06-18 PROCEDURE — 85610 PROTHROMBIN TIME: CPT | Performed by: EMERGENCY MEDICINE

## 2024-06-18 PROCEDURE — 36415 COLL VENOUS BLD VENIPUNCTURE: CPT | Performed by: EMERGENCY MEDICINE

## 2024-06-18 PROCEDURE — 96361 HYDRATE IV INFUSION ADD-ON: CPT

## 2024-06-18 PROCEDURE — 96374 THER/PROPH/DIAG INJ IV PUSH: CPT

## 2024-06-18 PROCEDURE — 85004 AUTOMATED DIFF WBC COUNT: CPT | Performed by: EMERGENCY MEDICINE

## 2024-06-18 RX ORDER — KETOROLAC TROMETHAMINE 15 MG/ML
15 INJECTION, SOLUTION INTRAMUSCULAR; INTRAVENOUS ONCE
Status: COMPLETED | OUTPATIENT
Start: 2024-06-18 | End: 2024-06-18

## 2024-06-18 RX ADMIN — SODIUM CHLORIDE 1000 ML: 9 INJECTION, SOLUTION INTRAVENOUS at 12:31

## 2024-06-18 RX ADMIN — SODIUM CHLORIDE 1000 ML: 9 INJECTION, SOLUTION INTRAVENOUS at 11:23

## 2024-06-18 RX ADMIN — KETOROLAC TROMETHAMINE 15 MG: 15 INJECTION, SOLUTION INTRAMUSCULAR; INTRAVENOUS at 11:25

## 2024-06-18 ASSESSMENT — COLUMBIA-SUICIDE SEVERITY RATING SCALE - C-SSRS
1. IN THE PAST MONTH, HAVE YOU WISHED YOU WERE DEAD OR WISHED YOU COULD GO TO SLEEP AND NOT WAKE UP?: NO
2. HAVE YOU ACTUALLY HAD ANY THOUGHTS OF KILLING YOURSELF IN THE PAST MONTH?: NO
6. HAVE YOU EVER DONE ANYTHING, STARTED TO DO ANYTHING, OR PREPARED TO DO ANYTHING TO END YOUR LIFE?: NO

## 2024-06-18 ASSESSMENT — ACTIVITIES OF DAILY LIVING (ADL)
ADLS_ACUITY_SCORE: 33
ADLS_ACUITY_SCORE: 35
ADLS_ACUITY_SCORE: 35

## 2024-06-18 NOTE — ED NOTES
Pt has c.o of bilateral neck pain, right sided head pain, tingling in bilateral hands. Pt rates pain a 5/10 and states it is sharp and intermittent. Pt has not taken anything for pain. Pt is able to move neck without difficulty. Pt reports dizziness/light headedness with ambulation. Pt denies visual changes, chest pain, SOB.

## 2024-06-18 NOTE — ED PROVIDER NOTES
"  History     Chief Complaint   Patient presents with    Neck Pain     HPI  Wesley Kim is a 58 year old male who presents with neck stiffness, unclear etiology, just does not quite feel right.  No headache.  No vision changes.  No neurologic changes otherwise.  Gradual onset.  Duration ongoing.  Character persistent.    Allergies:  Allergies   Allergen Reactions    Adhesive Tape Other (See Comments)     Tears skin    Amitriptyline      sedation    Codeine Nausea    Gabapentin      Temporary lost eyesight    Morphine And Codeine Nausea    Tegretol [Carbamazepine]      Lost vision         Problem List:    Patient Active Problem List    Diagnosis Date Noted    Renal colic 10/14/2015     Priority: Medium    Nephrolithiasis 10/14/2015     Priority: Medium        Past Medical History:    Past Medical History:   Diagnosis Date    Diabetes (H)     Epidermolysis bullosa     Peripheral neuropathy     Recurrent kidney stones        Past Surgical History:    Past Surgical History:   Procedure Laterality Date    GENITOURINARY SURGERY      14 procedures for stones       Family History:    Family History   Problem Relation Age of Onset    Thyroid Disease Mother     Hypertension Father     Hypertension Brother        Social History:  Marital Status:   [2]  Social History     Tobacco Use    Smoking status: Every Day     Current packs/day: 1.00     Average packs/day: 1 pack/day for 28.0 years (28.0 ttl pk-yrs)     Types: Cigarettes    Smokeless tobacco: Never   Substance Use Topics    Alcohol use: Yes     Comment: occasionally    Drug use: No        Medications:    furosemide (LASIX) 20 MG tablet  losartan (COZAAR) 50 MG tablet  METFORMIN HCL PO          Review of Systems  Respiratory denies.  Cardiovascular as.  GI denies.   denies.  Musculoskeletal Per HPI    Physical Exam   BP: 173/100  Pulse: 87  Temp: 96.9  F (36.1  C)  Resp: 18  Height: 167.6 cm (5' 6\")  Weight: 56.7 kg (125 lb)  SpO2: 98 %      Physical Exam  HENT: "      Head: Normocephalic and atraumatic.      Nose: Nose normal.      Mouth/Throat:      Mouth: Mucous membranes are moist.   Eyes:      Conjunctiva/sclera: Conjunctivae normal.      Pupils: Pupils are equal, round, and reactive to light.   Cardiovascular:      Rate and Rhythm: Normal rate.      Pulses: Normal pulses.   Pulmonary:      Effort: Pulmonary effort is normal. No respiratory distress.   Abdominal:      General: There is no distension.      Palpations: Abdomen is soft.      Tenderness: There is no abdominal tenderness.   Musculoskeletal:         General: Normal range of motion.      Cervical back: Normal range of motion and neck supple.   Skin:     General: Skin is warm and dry.      Capillary Refill: Capillary refill takes less than 2 seconds.   Neurological:      General: No focal deficit present.      Mental Status: He is alert.   Psychiatric:         Mood and Affect: Mood normal.     Neck seemingly slightly stiff.  No midline tenderness.  Symmetric.  No occipital nerve discomfort.  Neurologic exam completely normal.  Good strength throughout.     Results for orders placed or performed during the hospital encounter of 06/18/24 (from the past 24 hour(s))   [facility] Draw    Narrative    The following orders were created for panel order [facility].  Procedure                               Abnormality         Status                     ---------                               -----------         ------                     Extra Red Top Tube[[id number]]                               Final result               Extra Green Top (Lithium...[[id number]]                      Final result                 Please view results for these tests on the individual orders.   Extra Red Top Tube   Result Value Ref Range    Hold Specimen JIC    Extra Green Top (Lithium Heparin) ON ICE   Result Value Ref Range    Hold Specimen JIC    CBC with platelets differential    Narrative    The following orders were created for panel  order CBC with platelets differential.  Procedure                               Abnormality         Status                     ---------                               -----------         ------                     CBC with platelets and d...[[id number]]  Abnormal            Final result                 Please view results for these tests on the individual orders.   INR   Result Value Ref Range    INR 1.00 0.85 - 1.15   CBC with platelets and differential   Result Value Ref Range    WBC Count 9.2 4.0 - 11.0 10e3/uL    RBC Count 4.92 4.40 - 5.90 10e6/uL    Hemoglobin 17.0 13.3 - 17.7 g/dL    Hematocrit 48.2 40.0 - 53.0 %    MCV 98 78 - 100 fL    MCH 34.6 (H) 26.5 - 33.0 pg    MCHC 35.3 31.5 - 36.5 g/dL    RDW 11.9 10.0 - 15.0 %    Platelet Count 328 150 - 450 10e3/uL    % Neutrophils 72 %    % Lymphocytes 18 %    % Monocytes 8 %    % Eosinophils 1 %    % Basophils 1 %    % Immature Granulocytes 1 %    NRBCs per 100 WBC 0 <1 /100    Absolute Neutrophils 6.6 1.6 - 8.3 10e3/uL    Absolute Lymphocytes 1.6 0.8 - 5.3 10e3/uL    Absolute Monocytes 0.8 0.0 - 1.3 10e3/uL    Absolute Eosinophils 0.1 0.0 - 0.7 10e3/uL    Absolute Basophils 0.1 0.0 - 0.2 10e3/uL    Absolute Immature Granulocytes 0.1 <=0.4 10e3/uL    Absolute NRBCs 0.0 10e3/uL   Basic metabolic panel   Result Value Ref Range    Sodium 142 135 - 145 mmol/L    Potassium 4.1 3.4 - 5.3 mmol/L    Chloride 109 (H) 98 - 107 mmol/L    Carbon Dioxide (CO2) 23 22 - 29 mmol/L    Anion Gap 10 7 - 15 mmol/L    Urea Nitrogen 12.9 6.0 - 20.0 mg/dL    Creatinine 0.58 (L) 0.67 - 1.17 mg/dL    GFR Estimate >90 >60 mL/min/1.73m2    Calcium 8.1 (L) 8.6 - 10.0 mg/dL    Glucose 79 70 - 99 mg/dL   Hepatic function panel   Result Value Ref Range    Protein Total 6.0 (L) 6.4 - 8.3 g/dL    Albumin 3.7 3.5 - 5.2 g/dL    Bilirubin Total 0.4 <=1.2 mg/dL    Alkaline Phosphatase 74 40 - 150 U/L    AST 21 0 - 45 U/L    ALT 16 0 - 70 U/L    Bilirubin Direct <0.20 0.00 - 0.30 mg/dL   Extra Tube     Narrative    The following orders were created for panel order Extra Tube.  Procedure                               Abnormality         Status                     ---------                               -----------         ------                     Extra Blue Top Tube[[id number]]                              In process                 Extra Red Top Tube[[id number]]                               In process                 Extra Purple Top Tube[[id number]]                            In process                 Extra Heparinized Syringe[[id number]]                        In process                   Please view results for these tests on the individual orders.       Medications   sodium chloride 0.9% BOLUS 1,000 mL (1,000 mLs Intravenous $New Bag 6/18/24 1231)   sodium chloride 0.9% BOLUS 1,000 mL (1,000 mLs Intravenous $New Bag 6/18/24 1123)   ketorolac (TORADOL) injection 15 mg (15 mg Intravenous $Given 6/18/24 1125)       Assessments & Plan (with Medical Decision Making)   Bilateral neck stiffness, unclear etiology, given fluids here and feeling better.  Also given ketorolac.  Complete resolution with this therapy.  No other associated symptoms.  No other concerning features.  Feeling better would like to go home.  To return if any new or concerning symptoms.    New Prescriptions    No medications on file       Final diagnoses:   Acute muscle stiffness of neck       6/18/2024   [facility]       Darin Shin MD  06/18/24 1300

## 2024-06-18 NOTE — ED TRIAGE NOTES
"Patient here for bilateral neck pain that has gotten worse throughout the last couple days. States today he felt light headed with his pain. States it is like a \"Pulsating\" pain only when he is awake. States he can sleep fine.      Triage Assessment (Adult)       Row Name 06/18/24 0944          Triage Assessment    Airway WDL WDL        Respiratory WDL    Respiratory WDL WDL        Skin Circulation/Temperature WDL    Skin Circulation/Temperature WDL WDL        Cardiac WDL    Cardiac WDL WDL        Peripheral/Neurovascular WDL    Peripheral Neurovascular WDL WDL        Cognitive/Neuro/Behavioral WDL    Cognitive/Neuro/Behavioral WDL WDL                     "

## 2024-07-16 LAB
CREATININE (EXTERNAL): 0.65 MG/DL (ref 0.8–1.5)
GFR ESTIMATED (EXTERNAL): >90 ML/MIN/1.73M2
GLUCOSE (EXTERNAL): 118 MG/DL (ref 70–100)
PHQ9 SCORE: 3
POTASSIUM (EXTERNAL): 4.2 MMOL/L (ref 3.5–5.1)

## 2024-08-16 LAB
ALBUMIN (URINE) MG/SPEC: 10 MG/L
ALBUMIN/CREATININE RATIO: 12 MG/GM (ref 0–30)
ALT SERPL-CCNC: 30 U/L (ref 18–65)
AST SERPL-CCNC: 24 U/L (ref 10–40)
CHOLESTEROL (EXTERNAL): 145 MG/DL
CREATININE (EXTERNAL): 0.65 MG/DL (ref 0.8–1.5)
CREATININE (URINE): 86.5 MG/DL
GFR ESTIMATED (EXTERNAL): >90 ML/MIN/1.73M2
GLUCOSE (EXTERNAL): 100 MG/DL (ref 70–100)
HBA1C MFR BLD: 6 %
HDLC SERPL-MCNC: 65 MG/DL
LDL CHOLESTEROL CALCULATED (EXTERNAL): 70 MG/DL
POTASSIUM (EXTERNAL): 3.9 MMOL/L (ref 3.5–5.1)
TRIGLYCERIDES (EXTERNAL): 52 MG/DL

## 2024-10-07 ENCOUNTER — TRANSFERRED RECORDS (OUTPATIENT)
Dept: HEALTH INFORMATION MANAGEMENT | Facility: CLINIC | Age: 59
End: 2024-10-07

## 2024-10-07 ENCOUNTER — LAB REQUISITION (OUTPATIENT)
Dept: LAB | Facility: HOSPITAL | Age: 59
End: 2024-10-07
Payer: COMMERCIAL

## 2024-10-07 DIAGNOSIS — R07.9 CHEST PAIN, UNSPECIFIED: ICD-10-CM

## 2024-10-07 LAB
ALT SERPL-CCNC: 28 U/L (ref 18–65)
AST SERPL-CCNC: 25 U/L (ref 10–40)
CREATININE (EXTERNAL): 0.78 MG/DL (ref 0.8–1.5)
GFR ESTIMATED (EXTERNAL): >90 ML/MIN/1.73M2
GLUCOSE (EXTERNAL): 107 MG/DL (ref 70–100)
POTASSIUM (EXTERNAL): 4 MMOL/L (ref 3.5–5.1)
TROPONIN T SERPL HS-MCNC: 13 NG/L
TSH SERPL-ACNC: 1.56 UIU/ML (ref 0.35–4.8)

## 2024-10-07 PROCEDURE — 84484 ASSAY OF TROPONIN QUANT: CPT | Performed by: NURSE PRACTITIONER

## 2024-11-25 LAB
ALT SERPL-CCNC: 29 U/L (ref 18–65)
AST SERPL-CCNC: 30 U/L (ref 10–40)
CREATININE (EXTERNAL): 0.8 MG/DL (ref 0.8–1.5)
GFR ESTIMATED (EXTERNAL): >90 ML/MIN/1.73M2
GLUCOSE (EXTERNAL): 97 MG/DL (ref 70–100)
HBA1C MFR BLD: 6.4 %
POTASSIUM (EXTERNAL): 4 MMOL/L (ref 3.5–5.1)

## 2024-12-09 ENCOUNTER — TRANSFERRED RECORDS (OUTPATIENT)
Dept: HEALTH INFORMATION MANAGEMENT | Facility: CLINIC | Age: 59
End: 2024-12-09

## 2025-02-13 ENCOUNTER — TRANSFERRED RECORDS (OUTPATIENT)
Dept: HEALTH INFORMATION MANAGEMENT | Facility: CLINIC | Age: 60
End: 2025-02-13

## 2025-04-11 ENCOUNTER — TRANSFERRED RECORDS (OUTPATIENT)
Dept: HEALTH INFORMATION MANAGEMENT | Facility: CLINIC | Age: 60
End: 2025-04-11

## 2025-07-14 ENCOUNTER — TELEPHONE (OUTPATIENT)
Dept: FAMILY MEDICINE | Facility: OTHER | Age: 60
End: 2025-07-14